# Patient Record
Sex: FEMALE | Race: WHITE | NOT HISPANIC OR LATINO | URBAN - METROPOLITAN AREA
[De-identification: names, ages, dates, MRNs, and addresses within clinical notes are randomized per-mention and may not be internally consistent; named-entity substitution may affect disease eponyms.]

---

## 2017-03-23 ENCOUNTER — IMPORTED ENCOUNTER (OUTPATIENT)
Dept: URBAN - METROPOLITAN AREA CLINIC 1 | Facility: CLINIC | Age: 67
End: 2017-03-23

## 2017-06-26 ENCOUNTER — HOSPITAL ENCOUNTER (OUTPATIENT)
Dept: PHYSICAL THERAPY | Age: 67
Discharge: HOME | End: 2017-06-26
Payer: MEDICARE

## 2017-06-26 PROCEDURE — 97530 THERAPEUTIC ACTIVITIES: CPT

## 2017-06-26 PROCEDURE — G8984 CARRY CURRENT STATUS: HCPCS

## 2017-06-26 PROCEDURE — 97161 PT EVAL LOW COMPLEX 20 MIN: CPT

## 2017-06-26 PROCEDURE — 97110 THERAPEUTIC EXERCISES: CPT

## 2017-06-26 PROCEDURE — G8985 CARRY GOAL STATUS: HCPCS

## 2017-06-26 NOTE — PROGRESS NOTES
Michael [facility]er [facility]  [facility] PHYSICAL THERAPY  [address] [place] [address]ras, [address] - Phone: [phone]  Fax: [phone] / [address]  Patient Name: Rema Aranda : 1950   Medical   Diagnosis: Left shoulder pain [M25.512] Treatment Diagnosis: L SAD RCR   Onset Date: 17     Referral Source: Ellie Menchaca MD [facility], [place]): 2017   Prior Hospitalization: See medical history Provider #: [id number]   Prior Level of Function: Performing tasks around the home and in the yard   Comorbidities: See med hx   Medications: Verified on Patient Summary List   The Plan of Care and following information is based on the information from the initial evaluation.   ===========================================================================================  Assessment / key information: Patient is a 78 y.o. female s/p L SAD RCR. Pt reports being unable to sleep for more than 2 hours a night due to pain at rest and having to readjust sling. Pt shows desire to return to performing overhead ADLs and yardwork with L shoulder. Pt presents with the following ROM measurements:                                         AROM                                                              PROM    Left Right   Left   Flexion  Flexion 87   Extension NT 65 Extension NT   Scaption/ABD  Scaption/   ER @ 0 Degrees NT NT ER @ 0 Degrees NT   ER @ 90 Degrees NT 89 ER @ 90 Degrees NT   IR @ 90 Degrees NT 81 IR @ 90 Degrees NT   IR HBB NT NT IR HBB NT     Strength not assessed on L shoulder at this time due to recent surgery. We reviewed her precautions to which she verbalized understanding. Due to lack of transportation and and driving contraindication, patient will transfer to our [facility] clinic, which is walking distance from her home.  Pt would benefit from skilled PT services to address her impairments (listed below),  educate her, and improve her level of function. Thanks for your referral.   ===========================================================================================  Eval Complexity: History: MEDIUM  Complexity : 1-2 comorbidities / personal factors will impact the outcome/ POC Exam:LOW Complexity : 1-2 Standardized tests and measures addressing body structure, function, activity limitation and / or participation in recreation  Presentation: LOW Complexity : Stable, uncomplicated  Clinical Decision Making:Other outcome measures FOTO, QuickDASH  LOW Overall Complexity:LOW   Problem List: pain affecting function, decrease ROM, decrease strength, edema affecting function, impaired gait/ balance, decrease ADL/ functional abilitiies, decrease activity tolerance, decrease flexibility/ joint mobility and decrease transfer abilities   Treatment Plan may include any combination of the following: Therapeutic exercise, Therapeutic activities, Neuromuscular re-education, Physical agent/modality, Manual therapy, Patient education, Self Care training, Functional mobility training, Home safety training and Stair training  Patient / Family readiness to learn indicated by: asking questions, trying to perform skills and interest  Persons(s) to be included in education: patient (P)  Barriers to Learning/Limitations: None  Measures taken: n/a   Patient Goal (s): To use her arm again in yardwork and ADLs in the home   Patient self reported health status: good  Rehabilitation Potential: good   Short Term Goals: To be accomplished in  6  weeks:  1. Patient to be adherent to protocol precautions to ensure safety and proper healing  2. Patient to achieve PROM L shoulder flexion of > 130 degrees in order to prepare for AROM to reach overhead objects  3. Patient to achieve PROM L shoulder abduction of > 130 degrees in order to prepare for AROM to reach objects to the side   Long Term Goals: To be accomplished in  12  weeks:  1. Patient to be Independent with HEP to self-manage/prevent symptoms after DC. 2. Patient to achieve AROM L shoulder flexion of 140 degrees in order to facilitate ability to perform overhead activities and lifting light objects on top shelf  3. Patient to achieve AROM L shoulder abduction of 140 degrees in order to facilitate ability to perform washing/brushing of hair  4. Patient to improve FOTO score to > Stage 4 to indicate increased functional independence. Frequency / Duration:   Patient to be seen  2-3  times per week for 12  weeks:  Patient / Caregiver education and instruction: self care, activity modification, brace/ splint application and exercises. We reviewed our facility's Patient Personal Responsibilities (PPR) form, particularly in regards to compliance towards her appointment time, our attendance policy, and her home exercise program. Patient was informed of possible discharge for non-compliance to our attendance policy per PPR form. We also discussed her POC as deemed appropriate by the treating therapist and physician. Patient verbalized understanding that she must show objective and functional improvement in an appropriate time frame. Patient verbalized understanding that should progress or compliance be lacking, we will contact the referring physician for further consultation to address and attempt to establish alternate treatment strategies as necessary and/or possibly discharge. G-Codes (GP): Carry [id number] Current  CN= 100%. The severity rating is based on the FOTO Score and Quick DASH    Therapist Signature: Torsten Escamilla \"BJ\" Geovani Rees DPT, Cert. MDT, Cert. DN, Cert. SMT Date: 9/65/3679   Certification Period: 6/26/17 - 9/26/17 Time: 1:08 PM   ===========================================================================================  I certify that the above Physical Therapy Services are being furnished while the patient is under my care.   I agree with the treatment plan and certify that this therapy is necessary. Physician Signature:        Date:       Time:     Please sign and return to [employer] or you may fax the signed copy to [phone]. Thank you.

## 2017-06-27 ENCOUNTER — HOSPITAL ENCOUNTER (OUTPATIENT)
Dept: PHYSICAL THERAPY | Age: 67
Discharge: HOME | End: 2017-06-27
Payer: MEDICARE

## 2017-06-27 PROCEDURE — 97110 THERAPEUTIC EXERCISES: CPT | Performed by: PHYSICAL THERAPIST

## 2017-06-27 PROCEDURE — 97140 MANUAL THERAPY 1/> REGIONS: CPT | Performed by: PHYSICAL THERAPIST

## 2017-06-27 NOTE — PROGRESS NOTES
PT DAILY TREATMENT NOTE     Patient Name: Kyrie Durán  Date:2017  : 1950  [x]  Patient  Verified  Payor: Carlos Woodward / Plan: [place] / Product Type: Medicare /    In time:155pm  Out time:245pm  Total Treatment Time (min): 50  Total Timed Codes (min): 50  1:1 Treatment Time (min): 40   Visit #: 2 of     Treatment Area: Left shoulder pain [M25.512]    SUBJECTIVE  Pain Level (0-10 scale): 6  Any medication changes, allergies to medications, adverse drug reactions, diagnosis change, or new procedure performed?: [x] No    [] Yes (see summary sheet for update)  Subjective functional status/changes:   [] No changes reported  \" I took my pain medication at about 1 today, I am still waking every couple of hours. \"    OBJECTIVE  Modality rationale: decrease inflammation and decrease pain to improve the patients ability to relax for reduction mm tension   Min Type Additional Details    [] Estim: []Att   []Unatt        []TENS instruct                  []IFC  []Premod   []NMES                     []Other:  []w/US   []w/ice   []w/heat  Position:  Location:    []  Traction: [] Cervical       []Lumbar                       [] Prone          []Supine                       []Intermittent   []Continuous Lbs:  [] before manual  [] after manual    []  Ultrasound: []Continuous   [] Pulsed                           []1MHz   []3MHz Location:  W/cm2:    []  Iontophoresis with dexamethasone         Location: [] Take home patch   [] In clinic   10 [x]  Ice     []  heat  []  Ice massage Position:supine  Location: L shoulder    []  Vasopneumatic Device Pressure:       [] lo [] med [] hi   Temperature: [] lo [] med [] hi   [] Skin assessment post-treatment:  []intact []redness- no adverse reaction       []redness  adverse reaction:       15 min Therapeutic Exercise:  [x] See flow sheet :   Rationale: increase ROM, increase strength and increase proprioception to improve the patients ability to perform functional mobility and improve activity  endurance        25 min Manual Therapy:  PROM flexion, scaption , ER, SL scapular mobes    Rationale: decrease pain, increase ROM, increase tissue extensibility, decrease trigger points and increase postural awareness to allow for progression to AAROM and maintain mm flexibility. min Patient Education: [x] Review HEP    [] Progressed/Changed HEP based on:   [] positioning   [] body mechanics   [] transfers   [] heat/ice application        Other Objective/Functional Measures: Initiated POC , PROM with mild mm guarding and constant VC for relaxation, tolerated MT well. 130 deg  PROM FLexion  Scaption: 105 deg PROM    Pain Level (0-10 scale) post treatment: 6    ASSESSMENT/Changes in Function: Patient tolerated initial treatment well with mild mm guarding and VC needed for relaxation. PROM is progressing well, and patient to be educated in and start table slides next visit. Educated in shoulder rolls and scap retraction for HEP    Patient will continue to benefit from skilled PT services to modify and progress therapeutic interventions, address functional mobility deficits, address ROM deficits, address strength deficits, analyze and address soft tissue restrictions, analyze and cue movement patterns, analyze and modify body mechanics/ergonomics and assess and modify postural abnormalities to attain remaining goals. []  See Plan of Care  []  See progress note/recertification  []  See Discharge Summary         Progress towards goals / Updated goals: · Short Term Goals: To be accomplished in  6  weeks:  1. Patient to be adherent to protocol precautions to ensure safety and proper healing  2. Patient to achieve PROM L shoulder flexion of > 130 degrees in order to prepare for AROM to reach overhead objects  3. Patient to achieve PROM L shoulder abduction of > 130 degrees in order to prepare for AROM to reach objects to the side  · Long Term Goals:  To be accomplished in  12  weeks:  1. Patient to be Independent with HEP to self-manage/prevent symptoms after DC. 2. Patient to achieve AROM L shoulder flexion of 140 degrees in order to facilitate ability to perform overhead activities and lifting light objects on top shelf  3. Patient to achieve AROM L shoulder abduction of 140 degrees in order to facilitate ability to perform washing/brushing of hair  4. Patient to improve FOTO score to > Stage 4 to indicate increased functional independence.      PLAN  []  Upgrade activities as tolerated     []  Continue plan of care  []  Update interventions per flow sheet       []  Discharge due to:_  []  Other:_      Genna Garcia, PT 6/27/2017  10:45 AM

## 2017-06-29 ENCOUNTER — HOSPITAL ENCOUNTER (OUTPATIENT)
Dept: PHYSICAL THERAPY | Age: 67
Discharge: HOME | End: 2017-06-29
Payer: MEDICARE

## 2017-06-29 PROCEDURE — 97016 VASOPNEUMATIC DEVICE THERAPY: CPT

## 2017-06-29 PROCEDURE — 97110 THERAPEUTIC EXERCISES: CPT

## 2017-06-29 PROCEDURE — 97140 MANUAL THERAPY 1/> REGIONS: CPT

## 2017-06-29 NOTE — PROGRESS NOTES
PT DAILY TREATMENT NOTE     Patient Name: Tam Fernández  Date:2017  : 1950  [x]  Patient  Verified  Payor: Renny Frankel / Plan: [place] MED[place] & [place] / Product Type: Medicare /    In time: 8:34am  Out time: 9:26am  Total Treatment Time (min): 52  Total Timed Codes (min): 42  1:1 Treatment Time (min): 30  Visit #: 3 of     Treatment Area: Left shoulder pain [M25.512]    SUBJECTIVE  Pain Level (0-10 scale): 2  Any medication changes, allergies to medications, adverse drug reactions, diagnosis change, or new procedure performed?: [x] No    [] Yes (see summary sheet for update)  Subjective functional status/changes:   [] No changes reported  \"It feels better. \"    OBJECTIVE  Modality rationale: decrease inflammation and decrease pain to improve the patients ability to relax for reduction mm tension   Min Type Additional Details    [] Estim: []Att   []Unatt        []TENS instruct                  []IFC  []Premod   []NMES                     []Other:  []w/US   []w/ice   []w/heat  Position:  Location:    []  Traction: [] Cervical       []Lumbar                       [] Prone          []Supine                       []Intermittent   []Continuous Lbs:  [] before manual  [] after manual    []  Ultrasound: []Continuous   [] Pulsed                           []1MHz   []3MHz Location:  W/cm2:    []  Iontophoresis with dexamethasone         Location: [] Take home patch   [] In clinic    []  Ice     []  heat  []  Ice massage Position:supine  Location: L shoulder   10 [x]  Vasopneumatic Device Pressure:       [] lo [x] med [] hi   Temperature: [x] lo [] med [] hi   [x] Skin assessment post-treatment:  [x]intact []redness- no adverse reaction       []redness  adverse reaction:       27 min Therapeutic Exercise:  [x] See flow sheet: added table slides for flexion/scaption (additional time spent on cueing to utilize the hips to passively move the arm)   Rationale: increase ROM, increase strength and increase proprioception to improve the patients ability to perform functional mobility and improve activity  endurance    15 min Manual Therapy:  PROM flexion, scaption, [email or web address]   Rationale: decrease pain, increase ROM, increase tissue extensibility, decrease trigger points and increase postural awareness to allow for progression to AAROM and maintain mm flexibility. X min Patient Education: [x] Review HEP - added tables slides for flexion, scaption; discussed intensity of stretch into a gentle pain-free ROM, use of hips for table slides at home     Other Objective/Functional Measures:    Scaption: 115deg passive, with table slides, pain-free    Pain Level (0-10 scale) post treatment: 0    ASSESSMENT/Changes in Function:   Improved passive mobility into scaption today. Patient demo passive ER to ~neutral today at [address] Cont'd cueing to reduce mm guarding (~75deg scap), therefore, inc time spent on manual to progress passive mobility to 115deg today. Initiated VASO today in sitting to address GHJ edema/inflammation with (+) results. Patient will continue to benefit from skilled PT services to modify and progress therapeutic interventions, address functional mobility deficits, address ROM deficits, address strength deficits, analyze and address soft tissue restrictions, analyze and cue movement patterns, analyze and modify body mechanics/ergonomics and assess and modify postural abnormalities to attain remaining goals. []  See Plan of Care  []  See progress note/recertification  []  See Discharge Summary         Progress towards goals / Updated goals:  Short Term Goals: To be accomplished in  6  weeks:  1. Patient to be adherent to protocol precautions to ensure safety and proper healing  2. Patient to achieve PROM L shoulder flexion of > 130 degrees in order to prepare for AROM to reach overhead objects  3.  Patient to achieve PROM L shoulder abduction of > 130 degrees in order to prepare for AROM to reach objects to the side  Long Term Goals: To be accomplished in  12  weeks:  1. Patient to be Independent with HEP to self-manage/prevent symptoms after DC. 2. Patient to achieve AROM L shoulder flexion of 140 degrees in order to facilitate ability to perform overhead activities and lifting light objects on top shelf  3. Patient to achieve AROM L shoulder abduction of 140 degrees in order to facilitate ability to perform washing/brushing of hair  4. Patient to improve FOTO score to > Stage 4 to indicate increased functional independence.      PLAN  [x]  Upgrade activities as tolerated     [x]  Continue plan of care  []  Update interventions per flow sheet       []  Discharge due to:_  [x]  Other: assess response to modified HEP    Delmar Meckel, JACKELYN  6/29/2017

## 2017-07-03 ENCOUNTER — HOSPITAL ENCOUNTER (OUTPATIENT)
Dept: PHYSICAL THERAPY | Age: 67
Discharge: HOME | End: 2017-07-03
Payer: MEDICARE

## 2017-07-03 PROCEDURE — 97140 MANUAL THERAPY 1/> REGIONS: CPT

## 2017-07-03 PROCEDURE — 97110 THERAPEUTIC EXERCISES: CPT

## 2017-07-03 PROCEDURE — 97016 VASOPNEUMATIC DEVICE THERAPY: CPT

## 2017-07-03 NOTE — PROGRESS NOTES
PT DAILY TREATMENT NOTE     Patient Name: Jossy Arambula  Date:7/3/2017  : 1950  [x]  Patient  Verified  Payor: [place] MEDICARE / Plan: [place] MEDICARE PART A & B / Product Type: Medicare /    In time: 2:21pm  Out time: 3:11pm  Total Treatment Time (min): 50  Total Timed Codes (min): 40  1:1 Treatment Time (min): 35  Visit #: 4 of     Treatment Area: Left shoulder pain [M25.512]    SUBJECTIVE  Pain Level (0-10 scale): 3  Any medication changes, allergies to medications, adverse drug reactions, diagnosis change, or new procedure performed?: [x] No    [] Yes (see summary sheet for update)  Subjective functional status/changes:   [] No changes reported  \"I feel okay today. It was pinching a little with the table slides. \"    OBJECTIVE  Modality rationale: decrease inflammation and decrease pain to improve the patients ability to relax for reduction mm tension   Min Type Additional Details    [] Estim: []Att   []Unatt        []TENS instruct                  []IFC  []Premod   []NMES                     []Other:  []w/US   []w/ice   []w/heat  Position:  Location:    []  Traction: [] Cervical       []Lumbar                       [] Prone          []Supine                       []Intermittent   []Continuous Lbs:  [] before manual  [] after manual    []  Ultrasound: []Continuous   [] Pulsed                           []1MHz   []3MHz Location:  W/cm2:    []  Iontophoresis with dexamethasone         Location: [] Take home patch   [] In clinic    []  Ice     []  heat  []  Ice massage Position:supine  Location: L shoulder   10 [x]  Vasopneumatic Device Pressure:       [] lo [x] med [] hi   Temperature: [x] lo [] med [] hi   [x] Skin assessment post-treatment:  [x]intact []redness- no adverse reaction       []redness  adverse reaction:     25 min Therapeutic Exercise:  [x] See flow sheet:   Rationale: increase ROM, increase strength and increase proprioception to improve the patients ability to perform functional mobility and improve activity  endurance     15 min Manual Therapy:  (R) S/L (L) scap mobs; grade I/II GHJ mobs for pain relief; PROM flexion, scaption, [email or web address], gentle oscillations to reduce guarding   Rationale: decrease pain, increase ROM, increase tissue extensibility, decrease trigger points and increase postural awareness to allow for progression to AAROM and maintain mm flexibility. X min Patient Education: [x] Review HEP     Other Objective/Functional Measures:    (L) shoulder PROM: flex ~150deg, scap ~140deg, [email or web address] ~5deg    Pain Level (0-10 scale) post treatment: 0    ASSESSMENT/Changes in Function:   Improved shoulder mobility. Patient req VCs to reduce mm guarding. Reviewed positioning for table slides. Patient will continue to benefit from skilled PT services to modify and progress therapeutic interventions, address functional mobility deficits, address ROM deficits, address strength deficits, analyze and address soft tissue restrictions, analyze and cue movement patterns, analyze and modify body mechanics/ergonomics and assess and modify postural abnormalities to attain remaining goals. [x]  See Plan of Care  []  See progress note/recertification  []  See Discharge Summary         Progress towards goals / Updated goals:  Short Term Goals: To be accomplished in  6  weeks:  1. Patient to be adherent to protocol precautions to ensure safety and proper healing  2. Patient to achieve PROM L shoulder flexion of > 130 degrees in order to prepare for AROM to reach overhead objects. -Goal met; able to achieve PROM to ~150deg (7/3/17)  3. Patient to achieve PROM L shoulder abduction of > 130 degrees in order to prepare for AROM to reach objects to the side  Long Term Goals: To be accomplished in  12  weeks:  1. Patient to be Independent with HEP to self-manage/prevent symptoms after DC.   2. Patient to achieve AROM L shoulder flexion of 140 degrees in order to facilitate ability to perform overhead activities and lifting light objects on top shelf  3. Patient to achieve AROM L shoulder abduction of 140 degrees in order to facilitate ability to perform washing/brushing of hair  4. Patient to improve FOTO score to > stage 4 to indicate increased functional independence.      PLAN  [x]  Upgrade activities as tolerated     [x]  Continue plan of care  []  Update interventions per flow sheet       []  Discharge due to:_  []  Other:_    Surinder Nino, JACKELYN  7/3/2017

## 2017-07-06 ENCOUNTER — HOSPITAL ENCOUNTER (OUTPATIENT)
Dept: PHYSICAL THERAPY | Age: 67
Discharge: HOME | End: 2017-07-06
Payer: MEDICARE

## 2017-07-06 PROCEDURE — 97016 VASOPNEUMATIC DEVICE THERAPY: CPT

## 2017-07-06 PROCEDURE — 97140 MANUAL THERAPY 1/> REGIONS: CPT

## 2017-07-06 NOTE — PROGRESS NOTES
PT DAILY TREATMENT NOTE     Patient Name: Juliet Sanchez  Date:2017  : 1950  [x]  Patient  Verified  Payor: [place] MED[place]ARE / Plan: [place] MEDICARE PART A & B / Product Type: Medicare /    In time: 2:30pm       Out time: 3:26pm  Total Treatment Time (min):56  Total Timed Codes (min): 46  1:1 Treatment Time (min): 30  Visit #: 5 of     Treatment Area: Left shoulder pain [M25.512]    SUBJECTIVE  Pain Level (0-10 scale): 3  Any medication changes, allergies to medications, adverse drug reactions, diagnosis change, or new procedure performed?: [x] No    [] Yes (see summary sheet for update)  Subjective functional status/changes:   [] No changes reported  \"I could finally sleep for 4 hours last night. It was amazing. I saw the PA yesterday for my two week follow up and she was pleased with how I've been doing. \"    OBJECTIVE  Modality rationale: decrease inflammation and decrease pain to improve the patients ability to relax for reduction mm tension   Min Type Additional Details    [] Estim: []Att   []Unatt        []TENS instruct                  []IFC  []Premod   []NMES                     []Other:  []w/US   []w/ice   []w/heat  Position:  Location:    []  Traction: [] Cervical       []Lumbar                       [] Prone          []Supine                       []Intermittent   []Continuous Lbs:  [] before manual  [] after manual    []  Ultrasound: []Continuous   [] Pulsed                           []1MHz   []3MHz Location:  W/cm2:    []  Iontophoresis with dexamethasone         Location: [] Take home patch   [] In clinic    []  Ice     []  heat  []  Ice massage Position:supine  Location: L shoulder   10 [x]  Vasopneumatic Device Pressure:       [] lo [x] med [] hi   Temperature: [x] lo [] med [] hi   [x] Skin assessment post-treatment:  [x]intact []redness- no adverse reaction       []redness  adverse reaction:     26 min Therapeutic Exercise:  [x] See flow sheet: (N/C) no changes due to protocol Rationale: increase ROM, increase strength and increase proprioception to improve the patients ability to perform functional mobility and improve activity  endurance     20 min Manual Therapy:  (R) S/L (L) scap mobs with STM to UT/LS and latissimus dorsi; grade I/II GHJ mobs for pain relief; PROM flexion, scaption, [email or web address], gentle oscillations to reduce guarding   Rationale: decrease pain, increase ROM, increase tissue extensibility, decrease trigger points and increase postural awareness to allow for progression to AAROM and maintain mm flexibility. X min Patient Education: [x] Review HEP; reviewed post-surgical precautions, use of sling at all times, discussed rationale for persistent neck tightness due to lack of RTC strength     Other Objective/Functional Measures:     Pain Level (0-10 scale) post treatment: 0    ASSESSMENT/Changes in Function:   Passive mobility maintained from last session following gentle scap address. Significant UT, LS, and lat tightness slowly resolving. Patient will continue to benefit from skilled PT services to modify and progress therapeutic interventions, address functional mobility deficits, address ROM deficits, address strength deficits, analyze and address soft tissue restrictions, analyze and cue movement patterns, analyze and modify body mechanics/ergonomics and assess and modify postural abnormalities to attain remaining goals. [x]  See Plan of Care  []  See progress note/recertification  []  See Discharge Summary         Progress towards goals / Updated goals:  Short Term Goals: To be accomplished in  6  weeks:  1. Patient to be adherent to protocol precautions to ensure safety and proper healing  2. Patient to achieve PROM L shoulder flexion of > 130 degrees in order to prepare for AROM to reach overhead objects. -Goal met; able to achieve PROM to ~150deg (7/3/17)  3.  Patient to achieve PROM L shoulder abduction of > 130 degrees in order to prepare for AROM to reach objects to the side  Long Term Goals: To be accomplished in  12  weeks:  1. Patient to be Independent with HEP to self-manage/prevent symptoms after DC. 2. Patient to achieve AROM L shoulder flexion of 140 degrees in order to facilitate ability to perform overhead activities and lifting light objects on top shelf  3. Patient to achieve AROM L shoulder abduction of 140 degrees in order to facilitate ability to perform washing/brushing of hair  4. Patient to improve FOTO score to > stage 4 to indicate increased functional independence.      PLAN  [x]  Upgrade activities as tolerated     [x]  Continue plan of care  []  Update interventions per flow sheet       []  Discharge due to:_  [x]  Other: cont per MD protocol    Tonia Casarez, PTA  7/6/2017

## 2017-07-07 ENCOUNTER — HOSPITAL ENCOUNTER (OUTPATIENT)
Dept: PHYSICAL THERAPY | Age: 67
Discharge: HOME | End: 2017-07-07
Payer: MEDICARE

## 2017-07-07 PROCEDURE — 97140 MANUAL THERAPY 1/> REGIONS: CPT

## 2017-07-07 PROCEDURE — 97016 VASOPNEUMATIC DEVICE THERAPY: CPT

## 2017-07-07 NOTE — PROGRESS NOTES
PT DAILY TREATMENT NOTE     Patient Name: Nia Bourgeois  Date:2017  : 1950  [x]  Patient  Verified  Payor: [place] MEDICARE / Plan: [place] MEDICARE PART A & B / Product Type: Medicare /    In time: 2:05pm       Out time: 3:00pm  Total Treatment Time (min): 55  Total Timed Codes (min): 45  1:1 Treatment Time (min): 30  Visit #: 6 of     Treatment Area: Left shoulder pain [M25.512]    SUBJECTIVE  Pain Level (0-10 scale): 3  Any medication changes, allergies to medications, adverse drug reactions, diagnosis change, or new procedure performed?: [x] No    [] Yes (see summary sheet for update)  Subjective functional status/changes:   [] No changes reported  \"I could finally sleep for 4 hours last night. It was amazing. I saw the PA yesterday for my two week follow up and she was pleased with how I've been doing. \"    OBJECTIVE  Modality rationale: decrease inflammation and decrease pain to improve the patients ability to relax for reduction mm tension   Min Type Additional Details    [] Estim: []Att   []Unatt        []TENS instruct                  []IFC  []Premod   []NMES                     []Other:  []w/US   []w/ice   []w/heat  Position:  Location:    []  Traction: [] Cervical       []Lumbar                       [] Prone          []Supine                       []Intermittent   []Continuous Lbs:  [] before manual  [] after manual    []  Ultrasound: []Continuous   [] Pulsed                           []1MHz   []3MHz Location:  W/cm2:    []  Iontophoresis with dexamethasone         Location: [] Take home patch   [] In clinic    []  Ice     []  heat  []  Ice massage Position:supine  Location: L shoulder   10 [x]  Vasopneumatic Device Pressure:       [] lo [x] med [] hi   Temperature: [x] lo [] med [] hi   [x] Skin assessment post-treatment:  [x]intact []redness- no adverse reaction       []redness  adverse reaction:     26 min Therapeutic Exercise:  [x] See flow sheet: (N/C) no changes due to protocol Rationale: increase ROM, increase strength and increase proprioception to improve the patients ability to perform functional mobility and improve activity  endurance     30 min Manual Therapy:  STM to UT/LS, teres major, and lat; (R) S/L (L) scap mobs; grade II GHJ mobs for pain relief; PROM flexion, scaption, [email or web address] to neutral, gentle oscillations to reduce guarding   Rationale: decrease pain, increase ROM, increase tissue extensibility, decrease trigger points and increase postural awareness to allow for progression to AAROM and maintain mm flexibility. X min Patient Education: [x] Review HEP; reviewed post-surgical precautions, use of sling at all times, discussed rationale and stretching for persistent neck tightness due to lack of RTC strength     Other Objective/Functional Measures:    PROM: flex ~145deg, scap ~135deg, [email or web address] neutral    Pain Level (0-10 scale) post treatment: 0    ASSESSMENT/Changes in Function:   Persistent UT, LS, and lat tightness addressing well with manual interventions. Dowager's hump developing - discussed positioning of the shoulder and neck in sitting to reduce UT tightness. Patient will continue to benefit from skilled PT services to modify and progress therapeutic interventions, address functional mobility deficits, address ROM deficits, address strength deficits, analyze and address soft tissue restrictions, analyze and cue movement patterns, analyze and modify body mechanics/ergonomics and assess and modify postural abnormalities to attain remaining goals. [x]  See Plan of Care  []  See progress note/recertification  []  See Discharge Summary         Progress towards goals / Updated goals:  Short Term Goals: To be accomplished in  6  weeks:  1. Patient to be adherent to protocol precautions to ensure safety and proper healing  2. Patient to achieve PROM L shoulder flexion of > 130 degrees in order to prepare for AROM to reach overhead objects.  -Goal met; able to achieve PROM to ~145 deg flex (7/7/17)  3. Patient to achieve PROM L shoulder abduction of > 130 degrees in order to prepare for AROM to reach objects to the side. -Goal progressing; scaption to ~135 deg passive (7/7/17)  Long Term Goals: To be accomplished in  12  weeks:  1. Patient to be Independent with HEP to self-manage/prevent symptoms after DC. 2. Patient to achieve AROM L shoulder flexion of 140 degrees in order to facilitate ability to perform overhead activities and lifting light objects on top shelf  3. Patient to achieve AROM L shoulder abduction of 140 degrees in order to facilitate ability to perform washing/brushing of hair  4. Patient to improve FOTO score to > stage 4 to indicate increased functional independence.      PLAN  [x]  Upgrade activities as tolerated     [x]  Continue plan of care  []  Update interventions per flow sheet       []  Discharge due to:_  [x]  Other: cont per MD protocol; hold ER passed neutral    Ajith Escamilla PTA  7/7/2017

## 2017-07-10 ENCOUNTER — HOSPITAL ENCOUNTER (OUTPATIENT)
Dept: PHYSICAL THERAPY | Age: 67
Discharge: HOME | End: 2017-07-10
Payer: MEDICARE

## 2017-07-10 PROCEDURE — 97016 VASOPNEUMATIC DEVICE THERAPY: CPT

## 2017-07-10 PROCEDURE — 97140 MANUAL THERAPY 1/> REGIONS: CPT

## 2017-07-10 PROCEDURE — 97110 THERAPEUTIC EXERCISES: CPT

## 2017-07-10 NOTE — PROGRESS NOTES
PT DAILY TREATMENT NOTE     Patient Name: Juliet Sanchez  Date:7/10/2017  : 1950  [x]  Patient  Verified  Payor: [place] MEDICARE / Plan: [place] MEDICARE PART A & B / Product Type: Medicare /    In time: 439  Out time:927  Total Treatment Time (min): 56  Total Timed Codes (min): 46  1:1 Treatment Time (min): 126- 955  (28)  Visit #: 7 of 8-10    Treatment Area: Left shoulder pain [M25.512]    SUBJECTIVE  Pain Level (0-10 scale): 4  Any medication changes, allergies to medications, adverse drug reactions, diagnosis change, or new procedure performed?: [x] No    [] Yes (see summary sheet for update)  Subjective functional status/changes:   [] No changes reported  \"I had a god workout last week and I have been working hard at home. \"    OBJECTIVE  Modality rationale: decrease inflammation and decrease pain to improve the patients ability to relax for reduction mm tension   Min Type Additional Details    [] Estim: []Att   []Unatt        []TENS instruct                  []IFC  []Premod   []NMES                     []Other:  []w/US   []w/ice   []w/heat  Position:  Location:    []  Traction: [] Cervical       []Lumbar                       [] Prone          []Supine                       []Intermittent   []Continuous Lbs:  [] before manual  [] after manual    []  Ultrasound: []Continuous   [] Pulsed                           []1MHz   []3MHz Location:  W/cm2:    []  Iontophoresis with dexamethasone         Location: [] Take home patch   [] In clinic    []  Ice     []  heat  []  Ice massage Position:supine  Location: L shoulder   10 [x]  Vasopneumatic Device Pressure:       [] lo [x] med [] hi   Temperature: [x] lo [] med [] hi   [x] Skin assessment post-treatment:  [x]intact []redness- no adverse reaction       []redness  adverse reaction:     22 min Therapeutic Exercise:  [x] See flow sheet:    Rationale: increase ROM, increase strength and increase proprioception to improve the patients ability to perform functional mobility and improve activity  endurance     24 min Manual Therapy:  PROM all directions, grade 1-2 joint mobs in circumduction, SL scap mobs and UT release    Rationale: decrease pain, increase ROM, increase tissue extensibility, decrease trigger points and increase postural awareness to allow for progression to AAROM and maintain mm flexibility. X min Patient Education: [x] Review HEP- recommended UT cane release      Other Objective/Functional Measures:    STG 1- protcol, sling use, arm use : good compliance as patient has had R shoulder done previously     ER at 45 de deg after MT     Pain Level (0-10 scale) post treatment: 0    ASSESSMENT/Changes in Function: Patient is 2.5 weeks post op. ER is limited but loosened well with MT. Cont co CS tightness and recommend UT cane release at home to counteract sling use. Patient will continue to benefit from skilled PT services to modify and progress therapeutic interventions, address functional mobility deficits, address ROM deficits, address strength deficits, analyze and address soft tissue restrictions, analyze and cue movement patterns, analyze and modify body mechanics/ergonomics and assess and modify postural abnormalities to attain remaining goals. [x]  See Plan of Care  []  See progress note/recertification  []  See Discharge Summary         Progress towards goals / Updated goals:  Short Term Goals: To be accomplished in  6  weeks:  1. Patient to be adherent to protocol precautions to ensure safety and proper healing - goal met - compliant with protocol  7/10/17  2. Patient to achieve PROM L shoulder flexion of > 130 degrees in order to prepare for AROM to reach overhead objects. -Goal met; able to achieve PROM to ~145 deg flex (17)  3. Patient to achieve PROM L shoulder abduction of > 130 degrees in order to prepare for AROM to reach objects to the side.  -Goal progressing; scaption to ~135 deg passive (17)  Long Term Goals: To be accomplished in  12  weeks:  1. Patient to be Independent with HEP to self-manage/prevent symptoms after DC. 2. Patient to achieve AROM L shoulder flexion of 140 degrees in order to facilitate ability to perform overhead activities and lifting light objects on top shelf  3. Patient to achieve AROM L shoulder abduction of 140 degrees in order to facilitate ability to perform washing/brushing of hair  4. Patient to improve FOTO score to > stage 4 to indicate increased functional independence.      PLAN  [x]  Upgrade activities as tolerated     [x]  Continue plan of care  []  Update interventions per flow sheet       []  Discharge due to:_  [x]  Other: progress per MD protocol     Nghia Perez, PT  7/10/2017

## 2017-07-12 ENCOUNTER — HOSPITAL ENCOUNTER (OUTPATIENT)
Dept: PHYSICAL THERAPY | Age: 67
Discharge: HOME | End: 2017-07-12
Payer: MEDICARE

## 2017-07-12 PROCEDURE — 97016 VASOPNEUMATIC DEVICE THERAPY: CPT

## 2017-07-12 PROCEDURE — 97140 MANUAL THERAPY 1/> REGIONS: CPT

## 2017-07-12 NOTE — PROGRESS NOTES
PT DAILY TREATMENT NOTE     Patient Name: Karmen Hanks  Date:2017  : 1950  [x]  Patient  Verified  Payor: [place] MEDICARE / Plan: [place] MEDICARE PART A & B / Product Type: Medicare /    In time: 12:04pm     Out time: 1:00pm  Total Treatment Time (min): 56  Total Timed Codes (min): 46  1:1 Treatment Time (min): 30  Visit #: 8 of 8-10    Treatment Area: Left shoulder pain [M25.512]    SUBJECTIVE  Pain Level (0-10 scale): 3-4  Any medication changes, allergies to medications, adverse drug reactions, diagnosis change, or new procedure performed?: [x] No    [] Yes (see summary sheet for update)  Subjective functional status/changes:   [] No changes reported  \"It's the neck that hurts. \"    OBJECTIVE  Modality rationale: decrease inflammation and decrease pain to improve the patients ability to relax for reduction mm tension   Min Type Additional Details    [] Estim: []Att   []Unatt        []TENS instruct                  []IFC  []Premod   []NMES                     []Other:  []w/US   []w/ice   []w/heat  Position:  Location:    []  Traction: [] Cervical       []Lumbar                       [] Prone          []Supine                       []Intermittent   []Continuous Lbs:  [] before manual  [] after manual    []  Ultrasound: []Continuous   [] Pulsed                           []1MHz   []3MHz Location:  W/cm2:    []  Iontophoresis with dexamethasone         Location: [] Take home patch   [] In clinic    []  Ice     []  heat  []  Ice massage Position:supine  Location: L shoulder   10 [x]  Vasopneumatic Device Pressure:       [] lo [x] med [] hi   Temperature: [x] lo [] med [] hi   [x] Skin assessment post-treatment:  [x]intact []redness- no adverse reaction       []redness  adverse reaction:     20 min Therapeutic Exercise:  [x] See flow sheet: added table slides for ER   Rationale: increase ROM, increase strength and increase proprioception to improve the patients ability to perform functional mobility and improve activity  endurance     30 min Manual Therapy:  STM to UT/LS, teres major, pect, and lat; (R) S/L (L) scap mobs; grade II GHJ mobs for pain relief; PROM flexion, scaption, [email or web address], gentle oscillations to reduce guarding   Rationale: decrease pain, increase ROM, increase tissue extensibility, decrease trigger points and increase postural awareness to allow for progression to AAROM and maintain mm flexibility. X min Patient Education: [x] Review HEP     Other Objective/Functional Measures:    (L) shoulder PROM: flex/scap ~150deg,     Pain Level (0-10 scale) post treatment: 0    ASSESSMENT/Changes in Function:   Significant UT/LS tightness with inc TTP addressed well with manual interventions with corresponding improvement in scapular UR with passive mobility. Added table slides into ER to therex with patient educated on avoiding inc in pain with ROM. Patient will continue to benefit from skilled PT services to modify and progress therapeutic interventions, address functional mobility deficits, address ROM deficits, address strength deficits, analyze and address soft tissue restrictions, analyze and cue movement patterns, analyze and modify body mechanics/ergonomics and assess and modify postural abnormalities to attain remaining goals. [x]  See Plan of Care  []  See progress note/recertification  []  See Discharge Summary         Progress towards goals / Updated goals:  Short Term Goals: To be accomplished in  6  weeks:  1. Patient to be adherent to protocol precautions to ensure safety and proper healing - goal met - compliant with protocol  7/10/17  2. Patient to achieve PROM L shoulder flexion of > 130 degrees in order to prepare for AROM to reach overhead objects. -Goal met; able to achieve PROM to ~150 deg flex (7/12/17)  3. Patient to achieve PROM L shoulder abduction of > 130 degrees in order to prepare for AROM to reach objects to the side.  -Goal progressing; scaption to ~135 deg passive (7/7/17)  Long Term Goals: To be accomplished in  12  weeks:  1. Patient to be Independent with HEP to self-manage/prevent symptoms after DC. 2. Patient to achieve AROM L shoulder flexion of 140 degrees in order to facilitate ability to perform overhead activities and lifting light objects on top shelf  3. Patient to achieve AROM L shoulder abduction of 140 degrees in order to facilitate ability to perform washing/brushing of hair  4. Patient to improve FOTO score to > stage 4 to indicate increased functional independence.      PLAN  [x]  Upgrade activities as tolerated     [x]  Continue plan of care  []  Update interventions per flow sheet       []  Discharge due to:_  [x]  Other: stretch abduction; add table slide ER to [address] PTA  7/12/2017

## 2017-07-14 ENCOUNTER — HOSPITAL ENCOUNTER (OUTPATIENT)
Dept: PHYSICAL THERAPY | Age: 67
Discharge: HOME | End: 2017-07-14
Payer: MEDICARE

## 2017-07-14 PROCEDURE — 97140 MANUAL THERAPY 1/> REGIONS: CPT

## 2017-07-14 PROCEDURE — 97016 VASOPNEUMATIC DEVICE THERAPY: CPT

## 2017-07-14 PROCEDURE — 97110 THERAPEUTIC EXERCISES: CPT

## 2017-07-14 NOTE — PROGRESS NOTES
PT DAILY TREATMENT NOTE     Patient Name: Nessa Saravia  Date:2017  : 1950  [x]  Patient  Verified  Payor: Mela Echevarria / Plan: [place] MEDICARE PART A & B / Product Type: Medicare /    In time: 9:02am     Out time: 10:03am  Total Treatment Time (min): 61  Total Timed Codes (min): 51  1:1 Treatment Time (min): 40  Visit #: 9 of 8-10    Treatment Area: Left shoulder pain [M25.512]    SUBJECTIVE  Pain Level (0-10 scale): 3  Any medication changes, allergies to medications, adverse drug reactions, diagnosis change, or new procedure performed?: [x] No    [] Yes (see summary sheet for update)  Subjective functional status/changes:   [] No changes reported  \"I'm sleeping better. \"    OBJECTIVE  Modality rationale: decrease inflammation and decrease pain to improve the patients ability to relax for reduction mm tension   Min Type Additional Details    [] Estim: []Att   []Unatt        []TENS instruct                  []IFC  []Premod   []NMES                     []Other:  []w/US   []w/ice   []w/heat  Position:  Location:    []  Traction: [] Cervical       []Lumbar                       [] Prone          []Supine                       []Intermittent   []Continuous Lbs:  [] before manual  [] after manual    []  Ultrasound: []Continuous   [] Pulsed                           []1MHz   []3MHz Location:  W/cm2:    []  Iontophoresis with dexamethasone         Location: [] Take home patch   [] In clinic    []  Ice     []  heat  []  Ice massage Position:supine  Location: L shoulder   10 [x]  Vasopneumatic Device Pressure:       [] lo [x] med [] hi   Temperature: [x] lo [] med [] hi   [x] Skin assessment post-treatment:  [x]intact []redness- no adverse reaction       []redness  adverse reaction:     27 min Therapeutic Exercise:  [x] See flow sheet: added scap retractions and shoulder rolls (A/P) with reduced ROM to avoid inc in pain   Rationale: increase ROM, increase strength and increase proprioception to improve the patients ability to perform functional mobility and improve activity  endurance     24 min Manual Therapy:  STM to UT/LS, teres major, pect, and lat; (R) S/L (L) scap mobs; grade II GHJ mobs for pain relief; PROM flexion, scaption, [email or web address], gentle oscillations to reduce guarding   Rationale: decrease pain, increase ROM, increase tissue extensibility, decrease trigger points and increase postural awareness to allow for progression to AAROM and maintain mm flexibility. X min Patient Education: [x] Review HEP; reviewed post-surgical precautions as patient seen holding onto cell phone with (L) hand     Other Objective/Functional Measures:    Sleep quality improved. Shoulder PROM: flexion/scaption 145 deg    Pain Level (0-10 scale) post treatment: 0    ASSESSMENT/Changes in Function:   Patient has increasing passive mobility into flexion and scaption; cont gentle, slow progression into ER limited to ~10deg by min ERP. Reassessment due NV for PN and PT cont - patient currently 3 weeks, 2 days post-op, limited to PROM by MD protocol. Patient will continue to benefit from skilled PT services to modify and progress therapeutic interventions, address functional mobility deficits, address ROM deficits, address strength deficits, analyze and address soft tissue restrictions, analyze and cue movement patterns, analyze and modify body mechanics/ergonomics and assess and modify postural abnormalities to attain remaining goals. [x]  See Plan of Care  []  See progress note/recertification  []  See Discharge Summary         Progress towards goals / Updated goals:  Short Term Goals: To be accomplished in  6  weeks:  1. Patient to be adherent to protocol precautions to ensure safety and proper healing - goal met - compliant with protocol  7/10/17  2. Patient to achieve PROM L shoulder flexion of > 130 degrees in order to prepare for AROM to reach overhead objects. -Goal met; flexion PROM 145 deg (7/14/17)  3. Patient to achieve PROM L shoulder abduction of > 130 degrees in order to prepare for AROM to reach objects to the side. -Goal met; scaption PROM 145 deg (7/14/17)  Long Term Goals: To be accomplished in  12  weeks:  1. Patient to be Independent with HEP to self-manage/prevent symptoms after DC. 2. Patient to achieve AROM L shoulder flexion of 140 degrees in order to facilitate ability to perform overhead activities and lifting light objects on top shelf  3. Patient to achieve AROM L shoulder abduction of 140 degrees in order to facilitate ability to perform washing/brushing of hair  4. Patient to improve FOTO score to > stage 4 to indicate increased functional independence.      PLAN  [x]  Upgrade activities as tolerated     [x]  Continue plan of care  []  Update interventions per flow sheet       []  Discharge due to:_  [x]  Other: assess goals NV for PT cont, 2-3x for 8-10 treatments; cont per MD protocol    Orin Grant, PTA  7/14/2017

## 2017-07-17 ENCOUNTER — HOSPITAL ENCOUNTER (OUTPATIENT)
Dept: PHYSICAL THERAPY | Age: 67
Discharge: HOME | End: 2017-07-17
Payer: MEDICARE

## 2017-07-17 PROCEDURE — G8984 CARRY CURRENT STATUS: HCPCS

## 2017-07-17 PROCEDURE — G8985 CARRY GOAL STATUS: HCPCS

## 2017-07-17 PROCEDURE — 97016 VASOPNEUMATIC DEVICE THERAPY: CPT

## 2017-07-17 PROCEDURE — 97140 MANUAL THERAPY 1/> REGIONS: CPT

## 2017-07-17 NOTE — PROGRESS NOTES
[address] [facility] [address] [facility] AT [address]. [facility] [address]  Phone: [phone] Fax: [phone]  PROGRESS NOTE  Patient Name: Juliet Sanchez : 1950   Treatment/Medical Diagnosis: Left shoulder pain [M25.512]   Referral Source: Parker Talbot MD     Date of Initial Visit: 17; DOS 17 Attended Visits: 10 Missed Visits: 0     SUMMARY OF TREATMENT  Patient is a 78 y.o. female s/p L SAD RCR, DOS 17. Treatment has consisted of review of precautions and self-care, don/doffing sling, sleeping positions; Ther ex including distal UE ROM and strengthening, flexibility, scapular stabilization; manual therapy including: PROM GHJ, scapula, TrP release as needed; Patient education; HEP, cryotherapy for edema and pain control   CURRENT STATUS  Pt is making good progress in therapy. Con't to follow protocol which is limited to PROM phase at this time. Pain ranges from 2-4/10, ave 3/10 described as \"achy\", and pt rates 50% improvement. Score on the FOTO has improved from 32/100 at IE to 42/100. Steri strips intact in 2 anterior locations, no sxs infection, scope sites healing appropriately. Functional improvements: don/doff sling, getting dressed, sleeping comfortably in sidelying, walking without discomfort  Deficits: reaching, AROM L GHJ per protocol, sleeping supine, driving    PROM L GHJ: flexion 145, extension 30, Er at 0 30, scaption 140, IR at 45 25, ER at 45 30  Elbow AROM 100%  Wrist AROM 100%  Scapular mobility: PROM is full all planes, AROM improving with retraction and depression     Short Term Goals: To be accomplished in  6  weeks:  1. Patient to be adherent to protocol precautions to ensure safety and proper healing - goal met - compliant with protocol  7/10/17  2. Patient to achieve PROM L shoulder flexion of > 130 degrees in order to prepare for AROM to reach overhead objects. -Goal met; flexion PROM 145 deg (17)  3.  Patient to achieve PROM L shoulder abduction of > 130 degrees in order to prepare for AROM to reach objects to the side. -Goal met; scaption PROM 145 deg (7/14/17)  Long Term Goals: To be accomplished in  12  weeks:  1. Patient to be Independent with HEP to self-manage/prevent symptoms after DC. Con't to progress HEP as able per protocol limitations (7/17/17)  2. Patient to achieve AROM L shoulder flexion of 140 degrees in order to facilitate ability to perform overhead activities and lifting light objects on top shelf Pt con't in PROM phase per MD protocol. Will progress to AAROM at 6 weeks post-op (7/17/17)  3. Patient to achieve AROM L shoulder abduction of 140 degrees in order to facilitate ability to perform washing/brushing of hair Pt con't in PROM phase per MD protocol. Will progress to AAROM at 6 weeks post-op (7/17/17)  4. Patient to improve FOTO score to > stage 4 to indicate increased functional independence. Goal progressing at 42/100 (7/17/17)      New Goals to be achieved in __8-10__  treatments:  1. Patient to achieve AROM L shoulder flexion of 140 degrees in order to facilitate ability to perform overhead activities and lifting light objects on top shelf    2. Patient to achieve AROM L shoulder abduction of 140 degrees in order to facilitate ability to perform washing/brushing of hair    3. Patient to improve FOTO score to > stage 4 to indicate increased functional independence   4. Pt will have an increase in MMT of the L GHJ and scapular stabilizers to > or = 4/5 all planes to improve lifting, reaching, carrying      G-Codes: Carry  Current  CK= 40-59%    Goal  CJ= 20-39%. The severity rating is based on the FOTO Score  RECOMMENDATIONS  Recommend co'nt with PT interventions, 2-3x/week for 8-10 sessions, abiding by MD protocol with MD f/u as scheduled. Thank you    If you have any questions/comments please contact us directly at [phone].    Thank you for allowing us to assist in the care of your patient. Therapist Signature: Ne Bernal DPT Date: 7/17/2017     Time: 7:47 AM   NOTE TO PHYSICIAN:  PLEASE COMPLETE THE ORDERS BELOW AND FAX TO   [facility] at [facility]: [phone]. If you are unable to process this request in 24 hours please contact our office: [phone].  ___ I have read the above report and request that my patient continue as recommended.   ___ I have read the above report and request that my patient continue therapy with the following changes/special instructions:_________________________________________________________   ___ I have read the above report and request that my patient be discharged from therapy.      Physician Signature:        Date:       Time:

## 2017-07-17 NOTE — PROGRESS NOTES
PT DAILY TREATMENT NOTE     Patient Name: Pedro Fee  Date:2017  : 1950  [x]  Patient  Verified  Payor: [place] MED[place] / Plan: [place] MEDICARE PART [place] & B / Product Type: Medicare /    In time:9:01  Out time:9:50  Total Treatment Time (min): 49  Total Timed Codes (min): 39  1:1 Treatment Time (min): 29   Visit #: 10 of 8-10    Treatment Area: Left shoulder pain [M25.512]    SUBJECTIVE  Pain Level (0-10 scale): 3  Any medication changes, allergies to medications, adverse drug reactions, diagnosis change, or new procedure performed?: [x] No    [] Yes (see summary sheet for update)  Subjective functional status/changes:   [] No changes reported  Ave pain 3/10, (B) 2/10 \"achy\", (W): 4/10  Functional improvements: don/doff sling, getting dressed, sleeping comfortably in sidelying, walking without discomfort  Deficits: reaching, AROM L GHJ per protocol, sleeping supine, driving  42% improvement     OBJECTIVE  Modality rationale: decrease edema, decrease inflammation and decrease pain to improve the patients ability to improve comfort with sleeping, ADLs    Min Type Additional Details    [] Estim: []Att   []Unatt        []TENS instruct                  []IFC  []Premod   []NMES                     []Other:  []w/US   []w/ice   []w/heat  Position:  Location:    []  Traction: [] Cervical       []Lumbar                       [] Prone          []Supine                       []Intermittent   []Continuous Lbs:  [] before manual  [] after manual    []  Ultrasound: []Continuous   [] Pulsed                           []1MHz   []3MHz Location:  W/cm2:    []  Iontophoresis with dexamethasone         Location: [] Take home patch   [] In clinic    []  Ice     []  heat  []  Ice massage Position:  Location:   10 [x]  Vasopneumatic Device - Sitting Pressure:       [] lo [x] med [] hi   Temperature: [x] lo [] med [] hi   [x] Skin assessment post-treatment:  [x]intact []redness- no adverse reaction       []redness  adverse reaction:       14 (NC) min Therapeutic Exercise:  [x] See flow sheet : final 3 TE performed with VASO due to increased time needed for PN today    Rationale: increase ROM and increase strength to improve the patients ability to improve reaching, ADLs, dressing     25 min Manual Therapy:  DTM to L UT, posterior cuff; PROM L GHJ extension, ER and IR at 45, scaption, abduction, flexion; scapular mobs D1 and D2 flexion/extension. Reassessment for PN to MD    Rationale: decrease pain, increase ROM and increase tissue extensibility to improve prognosis for AROM phase           x min Patient Education: [x] Review HEP    [] Progressed/Changed HEP based on:     Reviewed con't PROM precautions, use of the sling, coming out of the sling for HEP; sleeping positions  Reviewed what's coming [] positioning   [] body mechanics   [] transfers   [] heat/ice application        Other Objective/Functional Measures:   PROM L GHJ: flexion 145, extension 30, Er at 0 30, scaption 140 (145 best), IR at 45 25, ER at 45 30  Elbow AROM 100%  Wrist AROM 100%  MMT Wrist 5/5, elbow 5/5  FOTO 42/100 (was 32/100)    Pain Level (0-10 scale) post treatment: 0    ASSESSMENT/Changes in Function: see PN. Patient will continue to benefit from skilled PT services to modify and progress therapeutic interventions, address functional mobility deficits, address ROM deficits, address strength deficits, analyze and address soft tissue restrictions, analyze and cue movement patterns, analyze and modify body mechanics/ergonomics, assess and modify postural abnormalities and instruct in home and community integration to attain remaining goals. []  See Plan of Care  [x]  See progress note/recertification   []  See Discharge Summary         Progress towards goals / Updated goals:  Short Term Goals: To be accomplished in  6  weeks:  1.  Patient to be adherent to protocol precautions to ensure safety and proper healing - goal met - compliant with protocol 7/10/17  2. Patient to achieve PROM L shoulder flexion of > 130 degrees in order to prepare for AROM to reach overhead objects. -Goal met; flexion PROM 145 deg (7/14/17)  3. Patient to achieve PROM L shoulder abduction of > 130 degrees in order to prepare for AROM to reach objects to the side. -Goal met; scaption PROM 145 deg (7/14/17)  Long Term Goals: To be accomplished in  12  weeks:  1. Patient to be Independent with HEP to self-manage/prevent symptoms after DC. Con't to progress HEP as able per protocol limitations (7/17/17)  2. Patient to achieve AROM L shoulder flexion of 140 degrees in order to facilitate ability to perform overhead activities and lifting light objects on top shelf Pt con't in PROM phase per MD protocol. Will progress to AAROM at 6 weeks post-op (7/17/17)  3. Patient to achieve AROM L shoulder abduction of 140 degrees in order to facilitate ability to perform washing/brushing of hair Pt con't in PROM phase per MD protocol. Will progress to AAROM at 6 weeks post-op (7/17/17)  4. Patient to improve FOTO score to > stage 4 to indicate increased functional independence. PLAN  []  Upgrade activities as tolerated     [x]  Continue plan of care  [x]  Update interventions per flow sheet       []  Discharge due to:_  [x]  Other:_  Please add PROM cane ER at 39 NV if pt can perform and con't to use PROM.      Bobbetta Paget, PT 7/17/2017  7:42 AM

## 2017-07-19 ENCOUNTER — HOSPITAL ENCOUNTER (OUTPATIENT)
Dept: PHYSICAL THERAPY | Age: 67
Discharge: HOME | End: 2017-07-19
Payer: MEDICARE

## 2017-07-19 PROCEDURE — 97016 VASOPNEUMATIC DEVICE THERAPY: CPT

## 2017-07-19 PROCEDURE — 97110 THERAPEUTIC EXERCISES: CPT

## 2017-07-19 PROCEDURE — 97140 MANUAL THERAPY 1/> REGIONS: CPT

## 2017-07-19 NOTE — PROGRESS NOTES
PT DAILY TREATMENT NOTE     Patient Name: Shad Barreto  Date:2017  : 1950  [x]  Patient  Verified  Payor: Mary Flynn / Plan: [place] MEDICARE PART A & B / Product Type: Medicare /    In time:826 Out time: 929  Total Treatment Time (min): 63  Total Timed Codes (min): 53  1:1 Treatment Time (min): 53   Visit #: 1 of 8-10    Treatment Area: Left shoulder pain [M25.512]    SUBJECTIVE  Pain Level (0-10 scale): 2  Any medication changes, allergies to medications, adverse drug reactions, diagnosis change, or new procedure performed?: [x] No    [] Yes (see summary sheet for update)  Subjective functional status/changes:   [] No changes reported  \"Just a little sore today. \"    OBJECTIVE  Modality rationale: decrease edema, decrease inflammation and decrease pain to improve the patients ability to improve comfort with sleeping, ADLs    Min Type Additional Details    [] Estim: []Att   []Unatt        []TENS instruct                  []IFC  []Premod   []NMES                     []Other:  []w/US   []w/ice   []w/heat  Position:  Location:    []  Traction: [] Cervical       []Lumbar                       [] Prone          []Supine                       []Intermittent   []Continuous Lbs:  [] before manual  [] after manual    []  Ultrasound: []Continuous   [] Pulsed                           []1MHz   []3MHz Location:  W/cm2:    []  Iontophoresis with dexamethasone         Location: [] Take home patch   [] In clinic    []  Ice     []  heat  []  Ice massage Position:  Location:   10 [x]  Vasopneumatic Device - Sitting Pressure:       [] lo [x] med [] hi   Temperature: [x] lo [] med [] hi   [x] Skin assessment post-treatment:  [x]intact []redness- no adverse reaction       []redness  adverse reaction:       30 min Therapeutic Exercise:  [x] See flow sheet :    Rationale: increase ROM and increase strength to improve the patients ability to improve reaching, ADLs, dressing     23 min Manual Therapy:  PROM flexion, ER, scaption, scar massage, joint oscillation mobs, SL extension and scap mobs sup/ inf and medial lateral    Rationale: decrease pain, increase ROM and increase tissue extensibility to improve prognosis for AROM phase           x min Patient Education: [x] Review HEP      Other Objective/Functional Measures: Added supine cane ER at 45 deg for improved GH mobility    Moderate scar adhesions    Flexion table slide 142 deg    Pain Level (0-10 scale) post treatment: 0    ASSESSMENT/Changes in Function: Patient is exactly 4 weeks post op today. 2 more weeks of PROM per MD protocol, then can progress to AAROM at 6 weeks (8/2/17). Moderate pain with scap elevation likely sec to guarding, improved with relaxation. Good response to cane ER with VCing for form and to avoid pain extremes. Able to maintain PROM    Patient will continue to benefit from skilled PT services to modify and progress therapeutic interventions, address functional mobility deficits, address ROM deficits, address strength deficits, analyze and address soft tissue restrictions, analyze and cue movement patterns, analyze and modify body mechanics/ergonomics, assess and modify postural abnormalities and instruct in home and community integration to attain remaining goals. []  See Plan of Care  [x]  See progress note/recertification 8/00/44  []  See Discharge Summary         Progress towards goals / Updated goals: PN complete last session - cont per updated goals 7/19/17  1. Patient to achieve AROM L shoulder flexion of 140 degrees in order to facilitate ability to perform overhead activities and lifting light objects on top shelf    2.  Patient to achieve AROM L shoulder abduction of 140 degrees in order to facilitate ability to perform washing/brushing of hair    3. Patient to improve FOTO score to > stage 4 to indicate increased functional independence   4.   Pt will have an increase in MMT of the L GHJ and scapular stabilizers to > or = 4/5 all planes to improve lifting, reaching, carrying        PLAN  []  Upgrade activities as tolerated     [x]  Continue plan of care  [x]  Update interventions per flow sheet       []  Discharge due to:_  [x]  Other: add cane ER to [address] [facility], PT 7/19/2017

## 2017-07-21 ENCOUNTER — HOSPITAL ENCOUNTER (OUTPATIENT)
Dept: PHYSICAL THERAPY | Age: 67
Discharge: HOME | End: 2017-07-21
Payer: MEDICARE

## 2017-07-21 PROCEDURE — 97140 MANUAL THERAPY 1/> REGIONS: CPT

## 2017-07-21 PROCEDURE — 97016 VASOPNEUMATIC DEVICE THERAPY: CPT

## 2017-07-21 NOTE — PROGRESS NOTES
PT DAILY TREATMENT NOTE     Patient Name: Yvonne Tuttle  Date:2017  : 1950  [x]  Patient  Verified  Payor: Julius Chadwick / Plan: [place] MEDICARE PART A & B / Product Type: Medicare /    In time: 9:00am      Out time:  9:50am  Total Treatment Time (min): 50  Total Timed Codes (min): 40  1:1 Treatment Time (min): 38  Visit #: 2 of 8-10    Treatment Area: Left shoulder pain [M25.512]    SUBJECTIVE  Pain Level (0-10 scale): 2  Any medication changes, allergies to medications, adverse drug reactions, diagnosis change, or new procedure performed?: [x] No    [] Yes (see summary sheet for update)  Subjective functional status/changes:   [] No changes reported  \"The scar is tender. \"    OBJECTIVE  Modality rationale: decrease edema, decrease inflammation and decrease pain to improve the patients ability to improve comfort with sleeping, ADLs    Min Type Additional Details    [] Estim: []Att   []Unatt        []TENS instruct                  []IFC  []Premod   []NMES                     []Other:  []w/US   []w/ice   []w/heat  Position:  Location:    []  Traction: [] Cervical       []Lumbar                       [] Prone          []Supine                       []Intermittent   []Continuous Lbs:  [] before manual  [] after manual    []  Ultrasound: []Continuous   [] Pulsed                           []1MHz   []3MHz Location:  W/cm2:    []  Iontophoresis with dexamethasone         Location: [] Take home patch   [] In clinic    []  Ice     []  heat  []  Ice massage Position:  Location:   10 [x]  Vasopneumatic Device - Sitting Pressure:       [] lo [x] med [] hi   Temperature: [x] lo [] med [] hi   [x] Skin assessment post-treatment:  [x]intact []redness- no adverse reaction       []redness  adverse reaction:       14 min Therapeutic Exercise:  [x] See flow sheet:    Rationale: increase ROM and increase strength to improve the patients ability to improve reaching, ADLs, dressing     26 min Manual Therapy:  RONALDO stein; PROM flexion, ER, scaption; scar massage; joint oscillation mobs, SL extension and scap mobs sup/inf and medial lateral, STM to teres group and mid-deltoid   Rationale: decrease pain, increase ROM and increase tissue extensibility to improve prognosis for AROM phase           X min Patient Education: [x] Review HEP      Other Objective/Functional Measures:    PROM flexion ~160 deg, ER ~40deg   HEP compliance: excellent    Pain Level (0-10 scale) post treatment: 0    ASSESSMENT/Changes in Function:   Improved passive mobility today into flexion and scaption. Limitations into ER noted to ~40 deg today. Patient will continue to benefit from skilled PT services to modify and progress therapeutic interventions, address functional mobility deficits, address ROM deficits, address strength deficits, analyze and address soft tissue restrictions, analyze and cue movement patterns, analyze and modify body mechanics/ergonomics, assess and modify postural abnormalities and instruct in home and community integration to attain remaining goals. []  See Plan of Care  [x]  See progress note/recertification 6/47/56  []  See Discharge Summary         Progress towards goals / Updated goals: PN complete last session - cont per updated goals 7/19/17  1. Patient to achieve AROM L shoulder flexion of 140 degrees in order to facilitate ability to perform overhead activities and lifting light objects on top shelf    2.  Patient to achieve AROM L shoulder abduction of 140 degrees in order to facilitate ability to perform washing/brushing of hair    3. Patient to improve FOTO score to > stage 4 to indicate increased functional independence   4.   Pt will have an increase in MMT of the L GHJ and scapular stabilizers to > or = 4/5 all planes to improve lifting, reaching, carrying        PLAN  [x]  Upgrade activities as tolerated     [x]  Continue plan of care  [x]  Update interventions per flow sheet       []  Discharge due to:_  [x] Other: cont per MD protocol, PROM, scapular therex    Keya Mention, PTA 7/21/2017

## 2017-07-23 NOTE — PROGRESS NOTES
PT DAILY TREATMENT NOTE     Patient Name: Ocie Lefort  Date:2017  : 1950  [x]  Patient  Verified  Payor: Crispin Vasques / Plan: [place] MEDICARE PART A & B / Product Type: Medicare /    In time: 900      Out time:  955  Total Treatment Time (min): 55  Total Timed Codes (min): 45   1:1 Treatment Time (min): 45  Visit #: 3 of 8-10    Treatment Area: Left shoulder pain [M25.512]    SUBJECTIVE  Pain Level (0-10 scale):  2  Any medication changes, allergies to medications, adverse drug reactions, diagnosis change, or new procedure performed?: [x] No    [] Yes (see summary sheet for update)  Subjective functional status/changes:   [] No changes reported  \"I was out this weekend and I was exhausted \"    OBJECTIVE  Modality rationale: decrease edema, decrease inflammation and decrease pain to improve the patients ability to improve comfort with sleeping, ADLs    Min Type Additional Details    [] Estim: []Att   []Unatt        []TENS instruct                  []IFC  []Premod   []NMES                     []Other:  []w/US   []w/ice   []w/heat  Position:  Location:    []  Traction: [] Cervical       []Lumbar                       [] Prone          []Supine                       []Intermittent   []Continuous Lbs:  [] before manual  [] after manual    []  Ultrasound: []Continuous   [] Pulsed                           []1MHz   []3MHz Location:  W/cm2:    []  Iontophoresis with dexamethasone         Location: [] Take home patch   [] In clinic    []  Ice     []  heat  []  Ice massage Position:  Location:   10 [x]  Vasopneumatic Device - Sitting Pressure:       [] lo [x] med [] hi   Temperature: [x] lo [] med [] hi   [x] Skin assessment post-treatment:  [x]intact []redness- no adverse reaction       []redness  adverse reaction:       21 min Therapeutic Exercise:  [x] See flow sheet:    Rationale: increase ROM and increase strength to improve the patients ability to improve reaching, ADLs, dressing     24 min Manual Therapy:  GHJ mobs; PROM flexion, ER, scaption; scar massage; joint oscillation mobs, SL extension and scap mobs sup/inf and medial lateral   Rationale: decrease pain, increase ROM and increase tissue extensibility to improve prognosis for AROM phase           X min Patient Education: [x] Review HEP      Other Objective/Functional Measures:    ER PROM : 22 with cane     Pain Level (0-10 scale) post treatment: 0    ASSESSMENT/Changes in Function: Patient presents with good progression of PROM. Medial scap has decreased tone - WNL post surgery. Patient feels scap retractions are no longer challenging - will progress scap strengthening NV. Cont VASO for post manual soreness     Patient will continue to benefit from skilled PT services to modify and progress therapeutic interventions, address functional mobility deficits, address ROM deficits, address strength deficits, analyze and address soft tissue restrictions, analyze and cue movement patterns, analyze and modify body mechanics/ergonomics, assess and modify postural abnormalities and instruct in home and community integration to attain remaining goals. []  See Plan of Care  [x]  See progress note/recertification 7/92/52  []  See Discharge Summary         Progress towards goals / Updated goals: All goals reviewed and progressing appropriately as of 7/24/2017  1. Patient to achieve AROM L shoulder flexion of 140 degrees in order to facilitate ability to perform overhead activities and lifting light objects on top shelf  - unable to initiate until 6 weeks post op 7/23/17   2. Patient to achieve AROM L shoulder abduction of 140 degrees in order to facilitate ability to perform washing/brushing of hair unable to initiate until 6 weeks post op 7/23/17    3. Patient to improve FOTO score to > stage 4 to indicate increased functional independence   4.   Pt will have an increase in MMT of the L GHJ and scapular stabilizers to > or = 4/5 all planes to improve lifting, reaching, carrying        PLAN  [x]  Upgrade activities as tolerated     [x]  Continue plan of care  [x]  Update interventions per flow sheet       []  Discharge due to:_  [x]  Other: add 1/2 prone scap retraction     Lilia Murillo, PT

## 2017-07-24 ENCOUNTER — HOSPITAL ENCOUNTER (OUTPATIENT)
Dept: PHYSICAL THERAPY | Age: 67
Discharge: HOME | End: 2017-07-24
Payer: MEDICARE

## 2017-07-24 PROCEDURE — 97140 MANUAL THERAPY 1/> REGIONS: CPT

## 2017-07-24 PROCEDURE — 97110 THERAPEUTIC EXERCISES: CPT

## 2017-07-24 PROCEDURE — 97016 VASOPNEUMATIC DEVICE THERAPY: CPT

## 2017-07-25 NOTE — PROGRESS NOTES
PT DAILY TREATMENT NOTE     Patient Name: Lannette Aschoff  Date:2017  : 1950  [x]  Patient  Verified  Payor: Kellie  / Plan: [place] / Product Type: Medicare /    In time: 190 Out time:  927  Total Treatment Time (min): 57  Total Timed Codes (min): 47  1:1 Treatment Time (min): 845 - 650 (32)  Visit #: 4 of 8-10    Treatment Area: Left shoulder pain [M25.512]    SUBJECTIVE  Pain Level (0-10 scale):  2  Any medication changes, allergies to medications, adverse drug reactions, diagnosis change, or new procedure performed?: [x] No    [] Yes (see summary sheet for update)  Subjective functional status/changes:   [] No changes reported  \"Doing ok\"  No new co     OBJECTIVE  Modality rationale: decrease edema, decrease inflammation and decrease pain to improve the patients ability to improve comfort with sleeping, ADLs    Min Type Additional Details    [] Estim: []Att   []Unatt        []TENS instruct                  []IFC  []Premod   []NMES                     []Other:  []w/US   []w/ice   []w/heat  Position:  Location:    []  Traction: [] Cervical       []Lumbar                       [] Prone          []Supine                       []Intermittent   []Continuous Lbs:  [] before manual  [] after manual    []  Ultrasound: []Continuous   [] Pulsed                           []1MHz   []3MHz Location:  W/cm2:    []  Iontophoresis with dexamethasone         Location: [] Take home patch   [] In clinic    []  Ice     []  heat  []  Ice massage Position:  Location:   10 [x]  Vasopneumatic Device - Sitting Pressure:       [] lo [x] med [] hi   Temperature: [x] lo [] med [] hi   [x] Skin assessment post-treatment:  [x]intact []redness- no adverse reaction       []redness  adverse reaction:       32 min Therapeutic Exercise:  [x] See flow sheet:    Rationale: increase ROM and increase strength to improve the patients ability to improve reaching, ADLs, dressing     15 min Manual Therapy:  RONALDO stein; PROM flexion, ER, scaption; scar massage; joint oscillation mobs, SL extension and scap mobs sup/inf and medial lateral   Rationale: decrease pain, increase ROM and increase tissue extensibility to improve prognosis for AROM phase           X min Patient Education: [x] Review HEP- added prone scap therex       Other Objective/Functional Measures: Added prone scap retraction EOB to address scap strength without compromising RTC repair    PROM ER approx 51 deg     Pain Level (0-10 scale) post treatment: 0    ASSESSMENT/Changes in Function: Patient tolerated new therex well with 100% VCing for form. Pain with SL shoulder ADD sec to stress on RTC, therefore held. Updated HEP . Patient aware up MD reassessment PN and will schedule more apts into AUG     Patient will continue to benefit from skilled PT services to modify and progress therapeutic interventions, address functional mobility deficits, address ROM deficits, address strength deficits, analyze and address soft tissue restrictions, analyze and cue movement patterns, analyze and modify body mechanics/ergonomics, assess and modify postural abnormalities and instruct in home and community integration to attain remaining goals. []  See Plan of Care  [x]  See progress note/recertification 9/47/53  []  See Discharge Summary         Progress towards goals / Updated goals: All goals reviewed and progressing appropriately as of 7/25/2017  1. Patient to achieve AROM L shoulder flexion of 140 degrees in order to facilitate ability to perform overhead activities and lifting light objects on top shelf  - unable to initiate until 6 weeks post op 7/23/17   2. Patient to achieve AROM L shoulder abduction of 140 degrees in order to facilitate ability to perform washing/brushing of hair unable to initiate until 6 weeks post op 7/23/17    3. Patient to improve FOTO score to > stage 4 to indicate increased functional independence   4.   Pt will have an increase in MMT of the L GHJ and scapular stabilizers to > or = 4/5 all planes to improve lifting, reaching, carrying        PLAN  [x]  Upgrade activities as tolerated     [x]  Continue plan of care  [x]  Update interventions per flow sheet       []  Discharge due to:_  [x]  Other: abbreviated PN (full PN complete 7/17) due NV prior to return to MD Linda Smith, PT

## 2017-07-26 ENCOUNTER — HOSPITAL ENCOUNTER (OUTPATIENT)
Dept: PHYSICAL THERAPY | Age: 67
Discharge: HOME | End: 2017-07-26
Payer: MEDICARE

## 2017-07-26 PROCEDURE — 97016 VASOPNEUMATIC DEVICE THERAPY: CPT

## 2017-07-26 PROCEDURE — 97140 MANUAL THERAPY 1/> REGIONS: CPT

## 2017-07-26 PROCEDURE — 97110 THERAPEUTIC EXERCISES: CPT

## 2017-07-28 ENCOUNTER — HOSPITAL ENCOUNTER (OUTPATIENT)
Dept: PHYSICAL THERAPY | Age: 67
Discharge: HOME | End: 2017-07-28
Payer: MEDICARE

## 2017-07-28 PROCEDURE — G8985 CARRY GOAL STATUS: HCPCS

## 2017-07-28 PROCEDURE — 97140 MANUAL THERAPY 1/> REGIONS: CPT

## 2017-07-28 PROCEDURE — G8984 CARRY CURRENT STATUS: HCPCS

## 2017-07-28 PROCEDURE — 97016 VASOPNEUMATIC DEVICE THERAPY: CPT

## 2017-07-28 PROCEDURE — 97110 THERAPEUTIC EXERCISES: CPT

## 2017-07-28 NOTE — PROGRESS NOTES
PT DAILY TREATMENT NOTE     Patient Name: Leslie Sandres  Date:2017  : 1950  [x]  Patient  Verified  Payor: Elsy Chen / Plan: [place] MED[place]ARE [place] & [place] / Product Type: Medicare /    In time: 9:32am        Out time: 10:40am  Total Treatment Time (min): 68  Total Timed Codes (min): 58  1:1 Treatment Time (min): 31  Visit #: 5 of 8-10    Treatment Area: Left shoulder pain [M25.512]    SUBJECTIVE  Pain Level (0-10 scale): 2  Any medication changes, allergies to medications, adverse drug reactions, diagnosis change, or new procedure performed?: [x] No    [] Yes (see summary sheet for update)  Subjective functional status/changes:   [] No changes reported  \"I still have that same pain, it's more like a dull ache in the shoulder. \"    OBJECTIVE  Modality rationale: decrease edema, decrease inflammation and decrease pain to improve the patients ability to improve comfort with sleeping, ADLs    Min Type Additional Details    [] Estim: []Att   []Unatt        []TENS instruct                  []IFC  []Premod   []NMES                     []Other:  []w/US   []w/ice   []w/heat  Position:  Location:    []  Traction: [] Cervical       []Lumbar                       [] Prone          []Supine                       []Intermittent   []Continuous Lbs:  [] before manual  [] after manual    []  Ultrasound: []Continuous   [] Pulsed                           []1MHz   []3MHz Location:  W/cm2:    []  Iontophoresis with dexamethasone         Location: [] Take home patch   [] In clinic    []  Ice     []  heat  []  Ice massage Position:  Location:   10 [x]  Vasopneumatic Device - sitting Pressure:       [] lo [x] med [] hi   Temperature: [x] lo [] med [] hi   [x] Skin assessment post-treatment:  [x]intact []redness- no adverse reaction       []redness  adverse reaction:       42 min Therapeutic Exercise:  [x] See flow sheet:    Rationale: increase ROM and increase strength to improve the patients ability to improve reaching, ADLs, dressing     16 min Manual Therapy:  GHJ mobs; PROM flexion, ER, scaption; scar massage; joint oscillation mobs, SL extension and scap mobs sup/inf and medial lateral   Rationale: decrease pain, increase ROM and increase tissue extensibility to improve prognosis for AROM phase           X min Patient Education: [x] Review HEP - added prone scap therex       Other Objective/Functional Measures:    (L) shoulder PROM: flex 158 deg, ext 49 deg, [email or web address] 50 deg, [email or web address] 40 deg   Pain: (A) 2    Pain Level (0-10 scale) post treatment: 0    ASSESSMENT/Changes in Function:  Slowly improving passive mobility. Cont'd tightness into ER/IR, but able to improve with manual - patient noting consistency with sling use at all times which most likely contributing to tightness. Patient will be seeing MD next week; will resend new PROM measurements along with 7/17/17 PN. Patient will continue to benefit from skilled PT services to modify and progress therapeutic interventions, address functional mobility deficits, address ROM deficits, address strength deficits, analyze and address soft tissue restrictions, analyze and cue movement patterns, analyze and modify body mechanics/ergonomics, assess and modify postural abnormalities and instruct in home and community integration to attain remaining goals. []  See Plan of Care  [x]  See progress note/recertification (3/73/92)   []  See Discharge Summary         Progress towards goals / Updated goals: All goals reviewed and progressing appropriately as of 7/28/2017  1. Patient to achieve AROM L shoulder flexion of 140 degrees in order to facilitate ability to perform overhead activities and lifting light objects on top shelf  - unable to initiate until 6 weeks post op 7/23/17   2. Patient to achieve AROM L shoulder abduction of 140 degrees in order to facilitate ability to perform washing/brushing of hair unable to initiate until 6 weeks post op 7/23/17    3.   Patient to improve FOTO score to > stage 4 to indicate increased functional independence   4.   Pt will have an increase in MMT of the L GHJ and scapular stabilizers to > or = 4/5 all planes to improve lifting, reaching, carrying        PLAN  [x]  Upgrade activities as tolerated     [x]  Continue plan of care  [x]  Update interventions per flow sheet       []  Discharge due to:_  [x]  Other: inquire about MD changes to program, sling use, etc.    Jose Romero, PTA

## 2017-08-01 NOTE — PROGRESS NOTES
[address] [facility] AT [address]. [facility] [address]  Phone: [phone] Fax: [phone]  PROGRESS NOTE  Patient Name: Piper Iglesias : 1950   Treatment/Medical Diagnosis: Left shoulder pain [M25.512]   Referral Source: Eagle Bennett MD     Date of Initial Visit: 17; DOS 17 Attended Visits: 10 Missed Visits: 0     SUMMARY OF TREATMENT  Patient is a 78 y.o. female s/p L SAD RCR, DOS 17. Treatment has consisted of review of precautions and self-care, don/doffing sling, sleeping positions; Ther ex including distal UE ROM and strengthening, flexibility, scapular stabilization; manual therapy including: PROM GHJ, scapula, TrP release as needed; Patient education; HEP, cryotherapy for edema and pain control. CURRENT STATUS  Pt is making good progress in therapy. Con't to follow protocol which is limited to PROM phase at this time. Pain ranges from 2-4/10, ave 3/10 described as \"achy\", and pt rates 50% improvement. Score on the FOTO has improved from 32/100 at IE to 42/100. Steri strips intact in 2 anterior locations, no sxs infection, scope sites healing appropriately. Functional improvements: don/doff sling, getting dressed, sleeping comfortably in sidelying, walking without discomfort  Deficits: reaching, AROM L GHJ per protocol, sleeping supine, driving    (L) shoulder PROM, updated from 17: flex 158 deg, ext 49 deg, [email or web address] 50 deg, [email or web address] 40 deg  Pain: (A) 2  Elbow AROM 100%  Wrist AROM 100%  Scapular mobility: PROM is full all planes, AROM improving with retraction and depression     Short Term Goals: To be accomplished in  6  weeks:  1. Patient to be adherent to protocol precautions to ensure safety and proper healing - goal met - compliant with protocol  7/10/17  2. Patient to achieve PROM L shoulder flexion of > 130 degrees in order to prepare for AROM to reach overhead objects.  -Goal met; flexion PROM 145 deg (7/14/17)  3. Patient to achieve PROM L shoulder abduction of > 130 degrees in order to prepare for AROM to reach objects to the side. -Goal met; scaption PROM 145 deg (7/14/17)  Long Term Goals: To be accomplished in  12  weeks:  1. Patient to be Independent with HEP to self-manage/prevent symptoms after DC. Con't to progress HEP as able per protocol limitations (7/17/17)  2. Patient to achieve AROM L shoulder flexion of 140 degrees in order to facilitate ability to perform overhead activities and lifting light objects on top shelf Pt con't in PROM phase per MD protocol. Will progress to AAROM at 6 weeks post-op (7/17/17)  3. Patient to achieve AROM L shoulder abduction of 140 degrees in order to facilitate ability to perform washing/brushing of hair Pt con't in PROM phase per MD protocol. Will progress to AAROM at 6 weeks post-op (7/17/17)  4. Patient to improve FOTO score to > stage 4 to indicate increased functional independence. Goal progressing at 42/100 (7/17/17)    New Goals to be achieved in __8-10__  treatments:  1. Patient to achieve AROM L shoulder flexion of 140 degrees in order to facilitate ability to perform overhead activities and lifting light objects on top shelf    2. Patient to achieve AROM L shoulder abduction of 140 degrees in order to facilitate ability to perform washing/brushing of hair    3. Patient to improve FOTO score to > stage 4 to indicate increased functional independence   4. Pt will have an increase in MMT of the L GHJ and scapular stabilizers to > or = 4/5 all planes to improve lifting, reaching, carrying      G-Codes: Carry  Current  CK= 40-59%    Goal  CJ= 20-39%. The severity rating is based on the FOTO Score     RECOMMENDATIONS  Recommend co'nt with PT interventions, 2-3x/week for 8-10 sessions, abiding by MD protocol with MD f/u as scheduled. Thank you! If you have any questions/comments please contact us directly at [phone].    Thank you for allowing us to assist in the care of your patient. PTA Signature: Ha Crowley PTA Date: 8/1/2017   PT Signature: Chin Vergara DPKHADAR  Time: 7:47 AM   NOTE TO PHYSICIAN:  PLEASE COMPLETE THE ORDERS BELOW AND FAX TO   [facility]: [phone]. If you are unable to process this request in 24 hours please contact our office: [phone].  ___ I have read the above report and request that my patient continue as recommended.   ___ I have read the above report and request that my patient continue therapy with the following changes/special instructions:_________________________________________________________   ___ I have read the above report and request that my patient be discharged from therapy.      Physician Signature:        Date:       Time:

## 2017-08-02 ENCOUNTER — HOSPITAL ENCOUNTER (OUTPATIENT)
Dept: PHYSICAL THERAPY | Age: 67
Discharge: HOME | End: 2017-08-02
Payer: MEDICARE

## 2017-08-02 PROCEDURE — 97140 MANUAL THERAPY 1/> REGIONS: CPT

## 2017-08-02 PROCEDURE — 97016 VASOPNEUMATIC DEVICE THERAPY: CPT

## 2017-08-02 NOTE — PROGRESS NOTES
PT DAILY TREATMENT NOTE     Patient Name: Piper Iglesias  Date:2017  : 1950  [x]  Patient  Verified  Payor: Ramila Raciel / Plan: [place] MEDICARE PART A & B / Product Type: Medicare /    In time: 11:01am        Out time: 12:15pm  Total Treatment Time (min): 74  Total Timed Codes (min): 64  1:1 Treatment Time (min): 30  Visit #: 1 of 8-10    Treatment Area: Left shoulder pain [M25.512]    SUBJECTIVE  Pain Level (0-10 scale): 2  Any medication changes, allergies to medications, adverse drug reactions, diagnosis change, or new procedure performed?: [x] No    [] Yes (see summary sheet for update)  Subjective functional status/changes:   [] No changes reported  Patient currently 6 weeks post-op, able to progress to [facility] per MD protocol. \"The doctor was pleased, she said to take off the sling, but gradually like you reiterated. \"    OBJECTIVE  Modality rationale: decrease edema, decrease inflammation and decrease pain to improve the patients ability to improve comfort with sleeping, ADLs    Min Type Additional Details    [] Estim: []Att   []Unatt        []TENS instruct                  []IFC  []Premod   []NMES                     []Other:  []w/US   []w/ice   []w/heat  Position:  Location:    []  Traction: [] Cervical       []Lumbar                       [] Prone          []Supine                       []Intermittent   []Continuous Lbs:  [] before manual  [] after manual    []  Ultrasound: []Continuous   [] Pulsed                           []1MHz   []3MHz Location:  W/cm2:    []  Iontophoresis with dexamethasone         Location: [] Take home patch   [] In clinic    []  Ice     []  heat  []  Ice massage Position:  Location:   10 [x]  Vasopneumatic Device - sitting Pressure:       [] lo [x] med [] hi   Temperature: [x] lo [] med [] hi   [x] Skin assessment post-treatment:  [x]intact []redness- no adverse reaction       []redness  adverse reaction:       34 min Therapeutic Exercise:  [x] See flow sheet: added fingerladder, cane flexion and ER, and pulleys   Rationale: increase ROM and increase strength to improve the patients ability to improve reaching, ADLs, dressing     30 min Manual Therapy:  GHJ mobs; PROM flexion, ER, scaption; scar massage; joint oscillation mobs, SL extension and scap mobs sup/inf and medial lateral   Rationale: decrease pain, increase ROM and increase tissue extensibility to improve prognosis for AROM phase           X min Patient Education: [x] Review HEP; discussed gradual sling weaning and expectations sec decreased passive support     Other Objective/Functional Measures:     Pain Level (0-10 scale) post treatment: 0    ASSESSMENT/Changes in Function:  Good tolerance to initiation of AAROM. Significant fatigue noted with AAROM cane flexion in supine, therefore, limited to 5 repetitions. Patient will continue to benefit from skilled PT services to modify and progress therapeutic interventions, address functional mobility deficits, address ROM deficits, address strength deficits, analyze and address soft tissue restrictions, analyze and cue movement patterns, analyze and modify body mechanics/ergonomics, assess and modify postural abnormalities and instruct in home and community integration to attain remaining goals. []  See Plan of Care  [x]  See progress note/recertification (7/33/02)   []  See Discharge Summary         Progress towards goals / Updated goals: All goals reviewed and progressing appropriately as of 8/2/2017  1. Patient to achieve AROM L shoulder flexion of 140 degrees in order to facilitate ability to perform overhead activities and lifting light objects on top shelf  - unable to initiate until 6 weeks post op 7/23/17   2. Patient to achieve AROM L shoulder abduction of 140 degrees in order to facilitate ability to perform washing/brushing of hair unable to initiate until 6 weeks post op 7/23/17    3.   Patient to improve FOTO score to > stage 4 to indicate increased functional independence   4.   Pt will have an increase in MMT of the L GHJ and scapular stabilizers to > or = 4/5 all planes to improve lifting, reaching, carrying        PLAN  [x]  Upgrade activities as tolerated     [x]  Continue plan of care  [x]  Update interventions per flow sheet       []  Discharge due to:_  [x]  Other: assess AROM and consider addition of gentle rhythmic stabs to manual    Lira Mutters, PTA 8/2/17

## 2017-08-04 ENCOUNTER — HOSPITAL ENCOUNTER (OUTPATIENT)
Dept: PHYSICAL THERAPY | Age: 67
Discharge: HOME | End: 2017-08-04
Payer: MEDICARE

## 2017-08-04 PROCEDURE — 97140 MANUAL THERAPY 1/> REGIONS: CPT

## 2017-08-04 PROCEDURE — 97110 THERAPEUTIC EXERCISES: CPT

## 2017-08-04 NOTE — PROGRESS NOTES
PT DAILY TREATMENT NOTE     Patient Name: Reynaldo Romero  Date:2017  : 1950  [x]  Patient  Verified  Payor: [place] MEDICARE / Plan: [place] MEDICARE PART A & B / Product Type: Medicare /    In time: 9:30am        Out time: 10:28am  Total Treatment Time (min): 58  Total Timed Codes (min): 48  1:1 Treatment Time (min): 24  Visit #: 2 of 8-10    Treatment Area: Left shoulder pain [M25.512]    SUBJECTIVE  Pain Level (0-10 scale): 3-4  Any medication changes, allergies to medications, adverse drug reactions, diagnosis change, or new procedure performed?: [x] No    [] Yes (see summary sheet for update)  Subjective functional status/changes:   [] No changes reported  \"I am sore from being out of the sling. \"    OBJECTIVE  Modality rationale: decrease edema, decrease inflammation and decrease pain to improve the patients ability to improve comfort with sleeping, ADLs    Min Type Additional Details    [] Estim: []Att   []Unatt        []TENS instruct                  []IFC  []Premod   []NMES                     []Other:  []w/US   []w/ice   []w/heat  Position:  Location:    []  Traction: [] Cervical       []Lumbar                       [] Prone          []Supine                       []Intermittent   []Continuous Lbs:  [] before manual  [] after manual    []  Ultrasound: []Continuous   [] Pulsed                           []1MHz   []3MHz Location:  W/cm2:    []  Iontophoresis with dexamethasone         Location: [] Take home patch   [] In clinic    []  Ice     []  heat  []  Ice massage Position:  Location:   10 [x]  Vasopneumatic Device - sitting Pressure:       [] lo [x] med [] hi   Temperature: [x] lo [] med [] hi   [x] Skin assessment post-treatment:  [x]intact []redness- no adverse reaction       []redness  adverse reaction:       33 min Therapeutic Exercise:  [x] See flow sheet: no changes sec DOMS   Rationale: increase ROM and increase strength to improve the patients ability to improve reaching, ADLs, dressing     15 min Manual Therapy:  GHJ mobs; PROM flexion, IR, ER, scaption; scar massage; joint oscillation mobs, SL extension and scap mobs sup/inf and medial lateral, STM biceps and deltoid   Rationale: decrease pain, increase ROM and increase tissue extensibility to improve prognosis for AROM phase           X min Patient Education: [x] Review HEP - added cane flexion and ER (photos declined)     Other Objective/Functional Measures:     Pain Level (0-10 scale) post treatment: 0 - \"frozen\"    ASSESSMENT/Changes in Function:  Patient reporting DOMS with progression into AAROM. Notable inc in tissue density along biceps and deltoid slowly improving. Patient will continue to benefit from skilled PT services to modify and progress therapeutic interventions, address functional mobility deficits, address ROM deficits, address strength deficits, analyze and address soft tissue restrictions, analyze and cue movement patterns, analyze and modify body mechanics/ergonomics, assess and modify postural abnormalities and instruct in home and community integration to attain remaining goals. []  See Plan of Care  [x]  See progress note/recertification (2/98/72)   []  See Discharge Summary         Progress towards goals / Updated goals: All goals reviewed and progressing appropriately as of 8/4/2017  1. Patient to achieve AROM L shoulder flexion of 140 degrees in order to facilitate ability to perform overhead activities and lifting light objects on top shelf  - unable to initiate until 6 weeks post op 7/23/17   2. Patient to achieve AROM L shoulder abduction of 140 degrees in order to facilitate ability to perform washing/brushing of hair unable to initiate until 6 weeks post op 7/23/17    3. Patient to improve FOTO score to > stage 4 to indicate increased functional independence   4.   Pt will have an increase in MMT of the L GHJ and scapular stabilizers to > or = 4/5 all planes to improve lifting, reaching, carrying        PLAN  [x]  Upgrade activities as tolerated     [x]  Continue plan of care  [x]  Update interventions per flow sheet       []  Discharge due to:_  [x]  Other: assess AROM and consider addition of gentle rhythmic stabs to manual    Fran Santos, JACKELYN 8/4/17

## 2017-08-08 ENCOUNTER — HOSPITAL ENCOUNTER (OUTPATIENT)
Dept: PHYSICAL THERAPY | Age: 67
Discharge: HOME | End: 2017-08-08
Payer: MEDICARE

## 2017-08-08 PROCEDURE — 97110 THERAPEUTIC EXERCISES: CPT | Performed by: PHYSICAL THERAPIST

## 2017-08-08 PROCEDURE — 97016 VASOPNEUMATIC DEVICE THERAPY: CPT | Performed by: PHYSICAL THERAPIST

## 2017-08-08 PROCEDURE — 97140 MANUAL THERAPY 1/> REGIONS: CPT | Performed by: PHYSICAL THERAPIST

## 2017-08-08 NOTE — PROGRESS NOTES
PT DAILY TREATMENT NOTE     Patient Name: Leslie Sanders  Date:2017  : 1950  [x]  Patient  Verified  Payor: Elsy Chen / Plan: [place] / Product Type: Medicare /    In time: 1200am        Out time: 105 pm  Total Treatment Time (min): 65  Total Timed Codes (min): 60  1:1 Treatment Time (min): 50  Visit #: 3 of 8-10    Treatment Area: Left shoulder pain [M25.512]    SUBJECTIVE  Pain Level (0-10 scale): 2  Any medication changes, allergies to medications, adverse drug reactions, diagnosis change, or new procedure performed?: [x] No    [] Yes (see summary sheet for update)  Subjective functional status/changes:   [] No changes reported  \"I feel better when I am out of the sling. I still just have that deltoid soreness. Michaelyn Daily \"    OBJECTIVE  Modality rationale: decrease edema, decrease inflammation and decrease pain to improve the patients ability to improve comfort with sleeping, ADLs    Min Type Additional Details    [] Estim: []Att   []Unatt        []TENS instruct                  []IFC  []Premod   []NMES                     []Other:  []w/US   []w/ice   []w/heat  Position:  Location:    []  Traction: [] Cervical       []Lumbar                       [] Prone          []Supine                       []Intermittent   []Continuous Lbs:  [] before manual  [] after manual    []  Ultrasound: []Continuous   [] Pulsed                           []1MHz   []3MHz Location:  W/cm2:    []  Iontophoresis with dexamethasone         Location: [] Take home patch   [] In clinic    []  Ice     []  heat  []  Ice massage Position:  Location:   10 [x]  Vasopneumatic Device - sitting Pressure:       [] lo [x] med [] hi   Temperature: [x] lo [] med [] hi   [x] Skin assessment post-treatment:  [x]intact []redness- no adverse reaction       []redness  adverse reaction:       40/35 min Therapeutic Exercise:  [x] See flow sheet: added SA punches   Rationale: increase ROM and increase strength to improve the patients ability to improve reaching, ADLs, dressing     15 min Manual Therapy:  GHJ mobs; PROM flexion, IR, ER, scaption; scar massage; joint oscillation mobs, SL extension and scap mobs sup/inf and medial lateral, STM biceps and deltoid   Rationale: decrease pain, increase ROM and increase tissue extensibility to improve prognosis for AROM phase           X min Patient Education: [x] Review HEP - added cane flexion and ER (photos declined)     Other Objective/Functional Measures: Increased winging noted on L scapular border, AAROM: 148deg flexion, added SA punches with good tolerance (in supine), multiple angle isometrics performed to decrease mm guarding with PROM IR/ER, patient continues with L UT and LS TrP. TTP in mid scapular border mm. Instructed in  Positioning for L UE when sitting and standing to facilitate decreased stress and increased postural awareness. Progress to rhythmic stabs nv    Pain Level (0-10 scale) post treatment: 0    ASSESSMENT/Changes in Function:  Progressing with scapular strengthening, instructed pt on performing stretches to L LS and UT 2x/day to help with TRP and decrease mm tension. Patient will continue to benefit from skilled PT services to modify and progress therapeutic interventions, address functional mobility deficits, address ROM deficits, address strength deficits, analyze and address soft tissue restrictions, analyze and cue movement patterns, analyze and modify body mechanics/ergonomics, assess and modify postural abnormalities and instruct in home and community integration to attain remaining goals. []  See Plan of Care  [x]  See progress note/recertification (3/41/36)   []  See Discharge Summary         Progress towards goals / Updated goals: All goals reviewed and progressing appropriately as of 8/8/2017  1.    Patient to achieve AROM L shoulder flexion of 140 degrees in order to facilitate ability to perform overhead activities and lifting light objects on top shelf  - unable to initiate until 6 weeks post op 7/23/17   2. Patient to achieve AROM L shoulder abduction of 140 degrees in order to facilitate ability to perform washing/brushing of hair unable to initiate until 6 weeks post op 7/23/17    3. Patient to improve FOTO score to > stage 4 to indicate increased functional independence   4.   Pt will have an increase in MMT of the L GHJ and scapular stabilizers to > or = 4/5 all planes to improve lifting, reaching, carrying        PLAN  [x]  Upgrade activities as tolerated     [x]  Continue plan of care  [x]  Update interventions per flow sheet       []  Discharge due to:_  [x]  Other: assess AROM and consider addition of gentle rhythmic stabs to manual    Chestine January, PT 8/4/17

## 2017-08-11 ENCOUNTER — HOSPITAL ENCOUNTER (OUTPATIENT)
Dept: PHYSICAL THERAPY | Age: 67
Discharge: HOME | End: 2017-08-11
Payer: MEDICARE

## 2017-08-11 PROCEDURE — 97016 VASOPNEUMATIC DEVICE THERAPY: CPT

## 2017-08-11 PROCEDURE — 97110 THERAPEUTIC EXERCISES: CPT

## 2017-08-11 PROCEDURE — 97140 MANUAL THERAPY 1/> REGIONS: CPT

## 2017-08-11 NOTE — PROGRESS NOTES
PT DAILY TREATMENT NOTE     Patient Name: Yvonne Tuttle  Date:2017  : 1950  [x]  Patient  Verified  Payor: Jluius Chadwick / Plan: [place] PART [place] / Product Type: Medicare /    In time: 8:31am        Out time: 9:56am  Total Treatment Time (min): 85  Total Timed Codes (min): 75  1:1 Treatment Time (min): 40  Visit #: 4 of 8-10    Treatment Area: Left shoulder pain [M25.512]    SUBJECTIVE  Pain Level (0-10 scale): 2  Any medication changes, allergies to medications, adverse drug reactions, diagnosis change, or new procedure performed?: [x] No    [] Yes (see summary sheet for update)  Subjective functional status/changes:   [] No changes reported  \"I think I turned a corner with my rehab. \"    OBJECTIVE  Modality rationale: decrease edema, decrease inflammation and decrease pain to improve the patients ability to improve comfort with sleeping, ADLs    Min Type Additional Details    [] Estim: []Att   []Unatt        []TENS instruct                  []IFC  []Premod   []NMES                     []Other:  []w/US   []w/ice   []w/heat  Position:  Location:    []  Traction: [] Cervical       []Lumbar                       [] Prone          []Supine                       []Intermittent   []Continuous Lbs:  [] before manual  [] after manual    []  Ultrasound: []Continuous   [] Pulsed                           []1MHz   []3MHz Location:  W/cm2:    []  Iontophoresis with dexamethasone         Location: [] Take home patch   [] In clinic    []  Ice     []  heat  []  Ice massage Position:  Location:   10 [x]  Vasopneumatic Device - sitting Pressure:       [] lo [x] med [] hi   Temperature: [x] lo [] med [] hi   [x] Skin assessment post-treatment:  [x]intact []redness- no adverse reaction       []redness  adverse reaction:       44 min Therapeutic Exercise:  [x] See flow sheet:    Rationale: increase ROM and increase strength to improve the patients ability to improve reaching, ADLs, dressing     31 min Manual Therapy:  GHJ mobs f/b PROM; gentle rhythmic stabs at 90 deg flexion   Rationale: decrease pain, increase ROM and increase tissue extensibility to improve prognosis for AROM phase           X min Patient Education: [x] Review HEP - added cane flexion and ER (photos declined)     Other Objective/Functional Measures:    Pain Level (0-10 scale) post treatment: 0    ASSESSMENT/Changes in Function:  Slowly developing scapular control with rhythmic stabs addition to manual interventions today. Patient able to achieve ~60 deg ER PROM at 45deg ABD following mobilization. Patient will continue to benefit from skilled PT services to modify and progress therapeutic interventions, address functional mobility deficits, address ROM deficits, address strength deficits, analyze and address soft tissue restrictions, analyze and cue movement patterns, analyze and modify body mechanics/ergonomics, assess and modify postural abnormalities and instruct in home and community integration to attain remaining goals. []  See Plan of Care  [x]  See progress note/recertification (3/54/05)   []  See Discharge Summary         Progress towards goals / Updated goals: All goals reviewed and progressing appropriately as of 8/11/2017  1. Patient to achieve AROM L shoulder flexion of 140 degrees in order to facilitate ability to perform overhead activities and lifting light objects on top shelf  - unable to initiate until 6 weeks post op 7/23/17   2. Patient to achieve AROM L shoulder abduction of 140 degrees in order to facilitate ability to perform washing/brushing of hair unable to initiate until 6 weeks post op 7/23/17    3. Patient to improve FOTO score to > stage 4 to indicate increased functional independence   4.   Pt will have an increase in MMT of the L GHJ and scapular stabilizers to > or = 4/5 all planes to improve lifting, reaching, carrying        PLAN  [x]  Upgrade activities as tolerated     [x]  Continue plan of care  [x] Update interventions per flow sheet       []  Discharge due to:_  [x]  Other: progress to AROM as tolerated next week    Trinidad Leal, PTA 8/11/17

## 2017-08-15 ENCOUNTER — HOSPITAL ENCOUNTER (OUTPATIENT)
Dept: PHYSICAL THERAPY | Age: 67
Discharge: HOME | End: 2017-08-15
Payer: MEDICARE

## 2017-08-15 PROCEDURE — 97110 THERAPEUTIC EXERCISES: CPT

## 2017-08-15 PROCEDURE — 97140 MANUAL THERAPY 1/> REGIONS: CPT

## 2017-08-15 NOTE — PROGRESS NOTES
PT DAILY TREATMENT NOTE     Patient Name: Juliet Sanchez  Date:8/15/2017  : 1950  [x]  Patient  Verified  Payor: [place] / Plan: [place] MED[place]ARE PART A & B / Product Type: Medicare /    In time: 10:02am        Out time: 11:00am  Total Treatment Time (min): 58  Total Timed Codes (min): 48  1:1 Treatment Time (min): 40  Visit #: 5 of 8-10    Treatment Area: Left shoulder pain [M25.512]    SUBJECTIVE  Pain Level (0-10 scale): 3  Any medication changes, allergies to medications, adverse drug reactions, diagnosis change, or new procedure performed?: [x] No    [] Yes (see summary sheet for update)  Subjective functional status/changes:   [] No changes reported  \"I can't get in a position of comfort. \"    OBJECTIVE  Modality rationale: decrease pain and increase tissue extensibility to improve the patients ability to improve comfort with sleeping, ADLs    Min Type Additional Details    [] Estim: []Att   []Unatt        []TENS instruct                  []IFC  []Premod   []NMES                     []Other:  []w/US   []w/ice   []w/heat  Position:  Location:    []  Traction: [] Cervical       []Lumbar                       [] Prone          []Supine                       []Intermittent   []Continuous Lbs:  [] before manual  [] after manual    []  Ultrasound: []Continuous   [] Pulsed                           []1MHz   []3MHz Location:  W/cm2:    []  Iontophoresis with dexamethasone         Location: [] Take home patch   [] In clinic   10 []  Ice     [x]  heat  []  Ice massage Position: seated  Location: (L) UT    []  Vasopneumatic Device - sitting Pressure:       [] lo [x] med [] hi   Temperature: [x] lo [] med [] hi   [x] Skin assessment post-treatment:  [x]intact []redness- no adverse reaction       []redness  adverse reaction:     24 min Therapeutic Exercise:  [x] See flow sheet: progressing to AROM sec patient achieving 8 weeks post-op tomorrow   Rationale: increase ROM and increase strength to improve the patients ability to improve reaching, ADLs, dressing     24 min Manual Therapy:  TPR to (L) UT and rhomboids; scap mobs, GHJ mobs f/b PROM; gentle rhythmic stabs at 90 deg flexion   Rationale: decrease pain, increase ROM and increase tissue extensibility to improve prognosis for AROM phase           X min Patient Education: [x] Review HEP - modified to table stretch, shoulder flexion to 90 degrees, SA punches, and S/L ER to parallel to floor     Other Objective/Functional Measures:    Pain Level (0-10 scale) post treatment: 0    ASSESSMENT/Changes in Function:  Significant UT tightness today released well with manual interventions with decreased pain to 0/10 - pt notes out of sling for last couple of days and has been increasing use of the shoulder. Encouraged pt to avoid shoulder elevation > 90 degrees at this time sec reduced RTC stability. Improved scapular stability with rhythmic stabs. Patient will continue to benefit from skilled PT services to modify and progress therapeutic interventions, address functional mobility deficits, address ROM deficits, address strength deficits, analyze and address soft tissue restrictions, analyze and cue movement patterns, analyze and modify body mechanics/ergonomics, assess and modify postural abnormalities and instruct in home and community integration to attain remaining goals. []  See Plan of Care  [x]  See progress note/recertification (8/90/50)   []  See Discharge Summary         Progress towards goals / Updated goals: All goals reviewed and progressing appropriately as of 8/15/2017  1. Patient to achieve AROM L shoulder flexion of 140 degrees in order to facilitate ability to perform overhead activities and lifting light objects on top shelf  - unable to initiate until 6 weeks post op 7/23/17   2.   Patient to achieve AROM L shoulder abduction of 140 degrees in order to facilitate ability to perform washing/brushing of hair unable to initiate until 6 weeks post op 7/23/17    3. Patient to improve FOTO score to > stage 4 to indicate increased functional independence   4.   Pt will have an increase in MMT of the L GHJ and scapular stabilizers to > or = 4/5 all planes to improve lifting, reaching, carrying        PLAN  [x]  Upgrade activities as tolerated     [x]  Continue plan of care  [x]  Update interventions per flow sheet       []  Discharge due to:_  [x]  Other: assess response to progressed HEP and encourage proper C/S posture to reduce UT tightness    Blake Hernandez, JACKELYN 8/15/2017

## 2017-08-18 ENCOUNTER — HOSPITAL ENCOUNTER (OUTPATIENT)
Dept: PHYSICAL THERAPY | Age: 67
Discharge: HOME | End: 2017-08-18
Payer: MEDICARE

## 2017-08-18 PROCEDURE — 97140 MANUAL THERAPY 1/> REGIONS: CPT

## 2017-08-18 NOTE — PROGRESS NOTES
PT DAILY TREATMENT NOTE     Patient Name: Jason Foss  Date:2017  : 1950  [x]  Patient  Verified  Payor: Mike Clay / Plan: [place] PART [place] / Product Type: Medicare /    In time: 10:01am        Out time: 11:20am  Total Treatment Time (min): 79  Total Timed Codes (min): 69  1:1 Treatment Time (min): 37  Visit #: 6 of 8-10    Treatment Area: Left shoulder pain [M25.512]    SUBJECTIVE  Pain Level (0-10 scale): 2  Any medication changes, allergies to medications, adverse drug reactions, diagnosis change, or new procedure performed?: [x] No    [] Yes (see summary sheet for update)  Subjective functional status/changes:   [] No changes reported  \"It's sore. \"    OBJECTIVE  Modality rationale: decrease pain and increase tissue extensibility to improve the patients ability to improve comfort with sleeping, ADLs    Min Type Additional Details    [] Estim: []Att   []Unatt        []TENS instruct                  []IFC  []Premod   []NMES                     []Other:  []w/US   []w/ice   []w/heat  Position:  Location:    []  Traction: [] Cervical       []Lumbar                       [] Prone          []Supine                       []Intermittent   []Continuous Lbs:  [] before manual  [] after manual    []  Ultrasound: []Continuous   [] Pulsed                           []1MHz   []3MHz Location:  W/cm2:    []  Iontophoresis with dexamethasone         Location: [] Take home patch   [] In clinic   10 []  Ice     [x]  heat  []  Ice massage Position: seated  Location: (L) UT    []  Vasopneumatic Device - sitting Pressure:       [] lo [x] med [] hi   Temperature: [x] lo [] med [] hi   [x] Skin assessment post-treatment:  [x]intact []redness- no adverse reaction       []redness  adverse reaction:     32 min Therapeutic Exercise:  [x] See flow sheet: progressing to AROM sec patient achieving 8 weeks post-op tomorrow   Rationale: increase ROM and increase strength to improve the patients ability to improve reaching, ADLs, dressing     37 min Manual Therapy:  TPR to (L) UT, rhomboids, lat; scap mobs, GHJ mobs f/b PROM; gentle rhythmic stabs at 90 deg flexion   Rationale: decrease pain, increase ROM and increase tissue extensibility to improve prognosis for AROM phase           X min Patient Education: [x] Review HEP - modified to table stretch, shoulder flexion to 90 degrees, SA punches, and S/L ER to parallel to floor     Other Objective/Functional Measures:    Pain Level (0-10 scale) post treatment: 0    ASSESSMENT/Changes in Function:  Patient progressing appropriately. Cont'd discomfort at anterolateral shoulder alleviated with TPR to (L) UT. Patient will continue to benefit from skilled PT services to modify and progress therapeutic interventions, address functional mobility deficits, address ROM deficits, address strength deficits, analyze and address soft tissue restrictions, analyze and cue movement patterns, analyze and modify body mechanics/ergonomics, assess and modify postural abnormalities and instruct in home and community integration to attain remaining goals. []  See Plan of Care  [x]  See progress note/recertification (5/17/47)   []  See Discharge Summary         Progress towards goals / Updated goals: All goals reviewed and progressing appropriately as of 8/18/2017  1. Patient to achieve AROM L shoulder flexion of 140 degrees in order to facilitate ability to perform overhead activities and lifting light objects on top shelf  - unable to initiate until 6 weeks post op 7/23/17   2. Patient to achieve AROM L shoulder abduction of 140 degrees in order to facilitate ability to perform washing/brushing of hair unable to initiate until 6 weeks post op 7/23/17    3. Patient to improve FOTO score to > stage 4 to indicate increased functional independence   4.   Pt will have an increase in MMT of the L GHJ and scapular stabilizers to > or = 4/5 all planes to improve lifting, reaching, carrying PLAN  [x]  Upgrade activities as tolerated     [x]  Continue plan of care  [x]  Update interventions per flow sheet       []  Discharge due to:_  [x]  Other: address lat tightness    Ross Payne, JACKELYN 8/18/2017

## 2017-08-22 ENCOUNTER — HOSPITAL ENCOUNTER (OUTPATIENT)
Dept: PHYSICAL THERAPY | Age: 67
Discharge: HOME | End: 2017-08-22
Payer: MEDICARE

## 2017-08-22 PROCEDURE — 97140 MANUAL THERAPY 1/> REGIONS: CPT

## 2017-08-22 NOTE — PROGRESS NOTES
PT DAILY TREATMENT NOTE     Patient Name: Juliet Sanchez  Date:2017  : 1950  [x]  Patient  Verified  Payor: Fox Fraction / Plan: [place]ARE PART A [employer] [place] / Product Type: Medicare /    In time: 10:31am        Out time: 11:33am  Total Treatment Time (min): 62  Total Timed Codes (min): 52  1:1 Treatment Time (min): 30  Visit #: 7 of 8-10    Treatment Area: Left shoulder pain [M25.512]    SUBJECTIVE  Pain Level (0-10 scale): 2  Any medication changes, allergies to medications, adverse drug reactions, diagnosis change, or new procedure performed?: [x] No    [] Yes (see summary sheet for update)  Subjective functional status/changes:   [] No changes reported  \"I am just tight. I have been using my arm more. \"    OBJECTIVE  Modality rationale: decrease pain and increase tissue extensibility to improve the patients ability to improve comfort with sleeping, ADLs    Min Type Additional Details    [] Estim: []Att   []Unatt        []TENS instruct                  []IFC  []Premod   []NMES                     []Other:  []w/US   []w/ice   []w/heat  Position:  Location:    []  Traction: [] Cervical       []Lumbar                       [] Prone          []Supine                       []Intermittent   []Continuous Lbs:  [] before manual  [] after manual    []  Ultrasound: []Continuous   [] Pulsed                           []1MHz   []3MHz Location:  W/cm2:    []  Iontophoresis with dexamethasone         Location: [] Take home patch   [] In clinic   10 []  Ice     [x]  heat  []  Ice massage Position: seated  Location: (L) UT    []  Vasopneumatic Device - sitting Pressure:       [] lo [x] med [] hi   Temperature: [x] lo [] med [] hi   [x] Skin assessment post-treatment:  [x]intact []redness- no adverse reaction       []redness  adverse reaction:     23 min Therapeutic Exercise:  [x] See flow sheet: added doorway ER stretch and biceps stretch, progressed shoulder x 4   Rationale: increase ROM and increase strength to improve the patients ability to improve reaching, ADLs, dressing     29 min Manual Therapy:  TPR to (L) anterior deltoid, GHJ mobs f/b PROM; gentle rhythmic stabs at 90 deg flexion   Rationale: decrease pain, increase ROM and increase tissue extensibility to improve prognosis for AROM phase           X min Patient Education: [x] Modified HEP     Other Objective/Functional Measures:   (L) shoulder AROM: flex 120 deg, [email or web address]    Pain Level (0-10 scale) post treatment: 0    ASSESSMENT/Changes in Function:  Patient demo slowly improving mobility in all directions. Cont'd limitations into ER, therefore, encouraged avoidance of OH reaching to avoid impingement. Pt responding well to scap stabs in supine. Patient will continue to benefit from skilled PT services to modify and progress therapeutic interventions, address functional mobility deficits, address ROM deficits, address strength deficits, analyze and address soft tissue restrictions, analyze and cue movement patterns, analyze and modify body mechanics/ergonomics, assess and modify postural abnormalities and instruct in home and community integration to attain remaining goals. []  See Plan of Care  [x]  See progress note/recertification (5/77/05)   []  See Discharge Summary         Progress towards goals / Updated goals: All goals reviewed and progressing appropriately as of 8/22/2017  1. Patient to achieve AROM L shoulder flexion of 140 degrees in order to facilitate ability to perform overhead activities and lifting light objects on top shelf  -Goal progressing; 125 deg shoulder flexion AROM with minimal UT substitutions (8/22/17)   2. Patient to achieve AROM L shoulder abduction of 140 degrees in order to facilitate ability to perform washing/brushing of hair unable to initiate until 6 weeks post op 7/23/17    3. Patient to improve FOTO score to > stage 4 to indicate increased functional independence   4.   Pt will have an increase in MMT of the L GHJ and scapular stabilizers to > or = 4/5 all planes to improve lifting, reaching, carrying      PLAN  [x]  Upgrade activities as tolerated     [x]  Continue plan of care  [x]  Update interventions per flow sheet       []  Discharge due to:_  [x]  Other: progress ROM/strengthening, cont to reduce manual to encourage (I)    Corrina Bill, PTA 8/22/2017

## 2017-08-25 ENCOUNTER — HOSPITAL ENCOUNTER (OUTPATIENT)
Dept: PHYSICAL THERAPY | Age: 67
Discharge: HOME | End: 2017-08-25
Payer: MEDICARE

## 2017-08-25 PROCEDURE — 97140 MANUAL THERAPY 1/> REGIONS: CPT

## 2017-08-25 NOTE — PROGRESS NOTES
PT DAILY TREATMENT NOTE     Patient Name: Lili Pride  Date:2017  : 1950  [x]  Patient  Verified  Payor: Sergo Varela / Plan: [place] / Product Type: Medicare /    In time: 10:30am        Out time: 11:36am  Total Treatment Time (min): 66  Total Timed Codes (min): 56  1:1 Treatment Time (min): 32  Visit #: 8 of 8-10    Treatment Area: Left shoulder pain [M25.512]    SUBJECTIVE  Pain Level (0-10 scale): 2  Any medication changes, allergies to medications, adverse drug reactions, diagnosis change, or new procedure performed?: [x] No    [] Yes (see summary sheet for update)  Subjective functional status/changes:   [] No changes reported  \"I mowed the grass the other day and felt fine. \"    OBJECTIVE  Modality rationale: decrease pain and increase tissue extensibility to improve the patients ability to improve comfort with sleeping, ADLs    Min Type Additional Details    [] Estim: []Att   []Unatt        []TENS instruct                  []IFC  []Premod   []NMES                     []Other:  []w/US   []w/ice   []w/heat  Position:  Location:    []  Traction: [] Cervical       []Lumbar                       [] Prone          []Supine                       []Intermittent   []Continuous Lbs:  [] before manual  [] after manual    []  Ultrasound: []Continuous   [] Pulsed                           []1MHz   []3MHz Location:  W/cm2:    []  Iontophoresis with dexamethasone         Location: [] Take home patch   [] In clinic   10 []  Ice     [x]  heat  []  Ice massage Position: seated  Location: (L) UT    []  Vasopneumatic Device - sitting Pressure:       [] lo [x] med [] hi   Temperature: [x] lo [] med [] hi   [x] Skin assessment post-treatment:  [x]intact []redness- no adverse reaction       []redness  adverse reaction:     30 min Therapeutic Exercise:  [x] See flow sheet: added wall pushup   Rationale: increase ROM and increase strength to improve the patients ability to improve reaching, ADLs, dressing     26 min Manual Therapy:  grade IV (L) GHJ mobs f/b PROM, scap stabs 2x30\"   Rationale: decrease pain, increase ROM and increase tissue extensibility to improve prognosis for AROM phase           X min Patient Education: [x] Modified HEP     Other Objective/Functional Measures:    [email or web address] PROM: ~55 deg     Pain Level (0-10 scale) post treatment: 0    ASSESSMENT/Changes in Function:  Improved mobility into ER following grade IV GHJ mobs. Patient will continue to benefit from skilled PT services to modify and progress therapeutic interventions, address functional mobility deficits, address ROM deficits, address strength deficits, analyze and address soft tissue restrictions, analyze and cue movement patterns, analyze and modify body mechanics/ergonomics, assess and modify postural abnormalities and instruct in home and community integration to attain remaining goals. []  See Plan of Care  [x]  See progress note/recertification (3/01/04)   []  See Discharge Summary         Progress towards goals / Updated goals: All goals reviewed and progressing appropriately as of 8/25/2017  1. Patient to achieve AROM L shoulder flexion of 140 degrees in order to facilitate ability to perform overhead activities and lifting light objects on top shelf  -Goal progressing; 125 deg shoulder flexion AROM with minimal UT substitutions (8/22/17)   2. Patient to achieve AROM L shoulder abduction of 140 degrees in order to facilitate ability to perform washing/brushing of hair unable to initiate until 6 weeks post op 7/23/17    3. Patient to improve FOTO score to > stage 4 to indicate increased functional independence   4.   Pt will have an increase in MMT of the L GHJ and scapular stabilizers to > or = 4/5 all planes to improve lifting, reaching, carrying      PLAN  [x]  Upgrade activities as tolerated     [x]  Continue plan of care  [x]  Update interventions per flow sheet       []  Discharge due to:_  [x] Other: progress ROM/strengthening, cont to reduce manual to encourage (I)    Keya Peraza, PTA 8/25/2017

## 2017-08-28 NOTE — PROGRESS NOTES
PT DAILY TREATMENT NOTE     Patient Name: Chela Haro  Date:2017  : 1950  [x]  Patient  Verified  Payor: Barbara Castellanos / Plan: [place] MEDICARE PART A & B / Product Type: Medicare /    In time: 10:00am        Out time: 11:20am  Total Treatment Time (min): 80  Total Timed Codes (min): 70  1:1 Treatment Time (min): 45  Visit #: 9 of 8-10    Treatment Area: Left shoulder pain [M25.512]    SUBJECTIVE  Pain Level (0-10 scale): 2  Any medication changes, allergies to medications, adverse drug reactions, diagnosis change, or new procedure performed?: [x] No    [] Yes (see summary sheet for update)  Subjective functional status/changes:   [] No changes reported  \"I am back to my normal stuff, but I avoid reaching in certain directions. \"    OBJECTIVE  Modality rationale: decrease pain and increase tissue extensibility to improve the patients ability to improve comfort with sleeping, ADLs    Min Type Additional Details    [] Estim: []Att   []Unatt        []TENS instruct                  []IFC  []Premod   []NMES                     []Other:  []w/US   []w/ice   []w/heat  Position:  Location:    []  Traction: [] Cervical       []Lumbar                       [] Prone          []Supine                       []Intermittent   []Continuous Lbs:  [] before manual  [] after manual    []  Ultrasound: []Continuous   [] Pulsed                           []1MHz   []3MHz Location:  W/cm2:    []  Iontophoresis with dexamethasone         Location: [] Take home patch   [] In clinic   10 []  Ice     [x]  heat  []  Ice massage Position: seated  Location: (L) UT    []  Vasopneumatic Device - sitting Pressure:       [] lo [x] med [] hi   Temperature: [x] lo [] med [] hi   [x] Skin assessment post-treatment:  [x]intact []redness- no adverse reaction       []redness  adverse reaction:     50 min Therapeutic Exercise:  [x] See flow sheet:   Rationale: increase ROM and increase strength to improve the patients ability to improve reaching, ADLs, dressing     20 min Manual Therapy:  grade IV (L) GHJ mobs f/b PROM   Rationale: decrease pain, increase ROM and increase tissue extensibility to improve prognosis for AROM phase           X min Patient Education: [x] Modified HEP     Other Objective/Functional Measures:    Shoulder AROM: flex 122deg, ext 40deg, abd 108deg, [email or web address] 40deg, IR/ADD to level of lateral glut   Shoulder PROM: flex 145deg, ext 60deg, abd 138deg, [email or web address] 70deg, [email or web address] 68deg   Shoulder strength, within available ROM: flex 4/5, ext 5/5, abd 4-/5, IR 5/5, ER 4/5   Pain: (A) 2     Pain Level (0-10 scale) post treatment: 0    ASSESSMENT/Changes in Function:  Patient making progress towards goals. Cont'd limitations into ER with capsular end-feel. Patient will continue to benefit from skilled PT services to modify and progress therapeutic interventions, address functional mobility deficits, address ROM deficits, address strength deficits, analyze and address soft tissue restrictions, analyze and cue movement patterns, analyze and modify body mechanics/ergonomics, assess and modify postural abnormalities and instruct in home and community integration to attain remaining goals. []  See Plan of Care  [x]  See progress note/recertification  []  See Discharge Summary         Progress towards goals / Updated goals: All goals reviewed and progressing appropriately as of 8/28/2017  1. Patient to achieve AROM L shoulder flexion of 140 degrees in order to facilitate ability to perform overhead activities and lifting light objects on top shelf  -Goal progressing; 125 deg shoulder flexion AROM with minimal UT substitutions (8/22/17)   2. Patient to achieve AROM L shoulder abduction of 140 degrees in order to facilitate ability to perform washing/brushing of hair unable to initiate until 6 weeks post op 7/23/17    3. Patient to improve FOTO score to > stage 4 to indicate increased functional independence   4.   Pt will have an increase in MMT of the L GHJ and scapular stabilizers to > or = 4/5 all planes to improve lifting, reaching, carrying      PLAN  [x]  Upgrade activities as tolerated     [x]  Continue plan of care  [x]  Update interventions per flow sheet       []  Discharge due to:_  [x]  Other: assess goals for PN TRACEY Rincon, PTA 8/29/2017

## 2017-08-29 ENCOUNTER — HOSPITAL ENCOUNTER (OUTPATIENT)
Dept: PHYSICAL THERAPY | Age: 67
Discharge: HOME | End: 2017-08-29
Payer: MEDICARE

## 2017-08-29 PROCEDURE — 97140 MANUAL THERAPY 1/> REGIONS: CPT

## 2017-08-29 PROCEDURE — 97110 THERAPEUTIC EXERCISES: CPT

## 2017-08-31 ENCOUNTER — HOSPITAL ENCOUNTER (OUTPATIENT)
Dept: PHYSICAL THERAPY | Age: 67
Discharge: HOME | End: 2017-08-31
Payer: MEDICARE

## 2017-08-31 PROCEDURE — G8985 CARRY GOAL STATUS: HCPCS

## 2017-08-31 PROCEDURE — 97140 MANUAL THERAPY 1/> REGIONS: CPT

## 2017-08-31 PROCEDURE — G8984 CARRY CURRENT STATUS: HCPCS

## 2017-08-31 PROCEDURE — 97110 THERAPEUTIC EXERCISES: CPT

## 2017-08-31 NOTE — PROGRESS NOTES
PT DAILY TREATMENT NOTE     Patient Name: Martine Katz  Date:2017  : 1950  [x]  Patient  Verified  Payor: Paulette Ferreira / Plan: [place] MEDICARE PART A [place] B / Product Type: Medicare /    In time: 10:03am       Out time: 11:13am  Total Treatment Time (min): 70  Total Timed Codes (min): 55  1:1 Treatment Time (min): 40  Visit #: 10 of 8-10    Treatment Area: Left shoulder pain [M25.512]    SUBJECTIVE  Pain Level (0-10 scale): 2  Any medication changes, allergies to medications, adverse drug reactions, diagnosis change, or new procedure performed?: [x] No    [] Yes (see summary sheet for update)  Subjective functional status/changes:   [] No changes reported  \"Still that dull ache. I have stenosis. You think it's that? \"    OBJECTIVE  Modality rationale: decrease pain and increase tissue extensibility to improve the patients ability to improve comfort with sleeping, ADLs    Min Type Additional Details    [] Estim: []Att   []Unatt        []TENS instruct                  []IFC  []Premod   []NMES                     []Other:  []w/US   []w/ice   []w/heat  Position:  Location:    []  Traction: [] Cervical       []Lumbar                       [] Prone          []Supine                       []Intermittent   []Continuous Lbs:  [] before manual  [] after manual    []  Ultrasound: []Continuous   [] Pulsed                           []1MHz   []3MHz Location:  W/cm2:    []  Iontophoresis with dexamethasone         Location: [] Take home patch   [] In clinic   15 []  Ice     [x]  heat  []  Ice massage Position: seated  Location: (L) UT    []  Vasopneumatic Device - sitting Pressure:       [] lo [x] med [] hi   Temperature: [x] lo [] med [] hi   [x] Skin assessment post-treatment:  [x]intact []redness- no adverse reaction       []redness  adverse reaction:     32 min Therapeutic Exercise:  [x] See flow sheet:   Rationale: increase ROM and increase strength to improve the patients ability to improve reaching, ADLs, dressing     23 min Manual Therapy:  Reassessment; grade IV (L) GHJ mobs f/b PROM, scap mobs, UT TPR and gentle manual traction   Rationale: decrease pain, increase ROM and increase tissue extensibility to improve prognosis for AROM phase           X min Patient Education: [x] Modified HEP     Other Objective/Functional Measures:    FOTO score: 56/100 (at IE, 42/100)   Shoulder AROM: flex 122deg, ext 40deg, abd 108deg, [email or web address] 40deg, IR/ADD to level of lateral glut   Shoulder PROM: flex 145deg, ext 60deg, abd 138deg, [email or web address] 70deg, [email or web address] 68deg   Shoulder strength, within available ROM: flex 4/5, ext 5/5, abd 4-/5, IR 5/5, ER 4/5   Pain: (A) 2   Capsular tightness noted; pt responding well to aggressive capsular stretching       Pain Level (0-10 scale) post treatment: 0    ASSESSMENT/Changes in Function:  See PN. Patient will continue to benefit from skilled PT services to modify and progress therapeutic interventions, address functional mobility deficits, address ROM deficits, address strength deficits, analyze and address soft tissue restrictions, analyze and cue movement patterns, analyze and modify body mechanics/ergonomics, assess and modify postural abnormalities and instruct in home and community integration to attain remaining goals. []  See Plan of Care  [x]  See progress note/recertification (9/42/17)  []  See Discharge Summary         Progress towards goals / Updated goals: All goals reviewed and progressing appropriately as of 8/31/2017  1. Patient to achieve AROM L shoulder flexion of 140 degrees in order to facilitate ability to perform overhead activities and lifting light objects on top shelf  -Goal progressing; 122 deg shoulder flexion AROM (NT at last assessment)   2. Patient to achieve AROM L shoulder abduction of 140 degrees in order to facilitate ability to perform washing/brushing of hair. -Goal progressing; 108 deg shoulder ABD AROM (NT at last assessment)   3.   Patient to improve FOTO score to > stage 4 to indicate increased functional independence. -Goal progressing; FOTO improved from stage 2 to stage 3   4.   Pt will have an increase in MMT of the L GHJ and scapular stabilizers to > or = 4/5 all planes to improve lifting, reaching, carrying. -Goal progressing; strength as per above     PLAN  [x]  Upgrade activities as tolerated     [x]  Continue plan of care  [x]  Update interventions per flow sheet       []  Discharge due to:_  [x]  Other: see PN; cont for remaining scheduled sessions    Danielle Jones, PTA 8/31/2017

## 2017-08-31 NOTE — PROGRESS NOTES
[address] [facility] [address] [facility] AT [address]  Phone: [phone] Fax: [phone]  PROGRESS NOTE  Patient Name: Lili Pride : 1950   Treatment/Medical Diagnosis: Left shoulder pain [M25.512]   Referral Source: Petar Chavez MD     Date of Initial Visit: 17; DOS 17 Attended Visits: 25 Missed Visits: 0     SUMMARY OF TREATMENT  Patient is a 78 y.o. female s/p L SAD RCR, DOS 17. Treatment has consisted of review of precautions and self-care, don/doffing sling, sleeping positions; Ther ex including distal UE ROM and strengthening, flexibility, scapular stabilization; manual therapy including: PROM GHJ, scapula, TrP release as needed; Patient education; HEP, cryotherapy for edema and pain control. CURRENT STATUS  Patient cont'ing to develop appropriate scapular control for OH reaching. Pain levels average 2/10 on average and 0/10 at best. Patient notes ability to return to mowing grass without exacerbation of symptoms. Capsular tightness beginning to develop and addressing well with aggressive stretching. Objective assessment as follows:  Improvements: washing, bathing, cooking, ADLs, ADLs out of sling, brushing teeth, driving  Deficits: full AROM, 2/10 average pain, capsular tightness  Shoulder AROM: flex 122deg, ext 40deg, abd 108deg, [email or web address] 40deg, IR/ADD to level of lateral glut  Shoulder PROM: flex 145deg, ext 60deg, abd 138deg, [email or web address] 70deg, [email or web address] 68deg  Shoulder strength, within available ROM: flex 4/5, ext 5/5, abd 4-/5, IR 5/5, ER 4/5  Pain: (A) 2      Goals:  1.   Patient to achieve AROM L shoulder flexion of 140 degrees in order to facilitate ability to perform overhead activities and lifting light objects on top shelf  -Goal progressing; 122 deg shoulder flexion AROM (NT at last assessment)   2.   Patient to achieve AROM L shoulder abduction of 140 degrees in order to facilitate ability to perform washing/brushing of hair. -Goal progressing; 108 deg shoulder ABD AROM (NT at last assessment)   3. Patient to improve FOTO score to > stage 4 to indicate increased functional independence. -Goal progressing; FOTO improved from stage 2 to stage 3   4. Pt will have an increase in MMT of the L GHJ and scapular stabilizers to > or = 4/5 all planes to improve lifting, reaching, carrying. -Goal progressing; strength as per above     New Goals to be achieved in __2_  treatments:  Cont per established goals as currently progressing. G-Codes: Carry [id number] Current  CK= 40-59%   [id number] Goal  CJ= 20-39%. The severity rating is based on the FOTO Score     RECOMMENDATIONS  Recommend co'nt with PT interventions for remaining scheduled sessions. Thank you! If you have any questions/comments please contact us directly at [phone]. Thank you for allowing us to assist in the care of your patient. PTA Signature: Camilo Leal, JACKELYN Date: 8/31/2017   PT Signature: Emery Welch DPT  Time: 7:47 AM   NOTE TO PHYSICIAN:  PLEASE COMPLETE THE ORDERS BELOW AND FAX TO   [facility]: [phone]. If you are unable to process this request in 24 hours please contact our office: [phone].  ___ I have read the above report and request that my patient continue as recommended.   ___ I have read the above report and request that my patient continue therapy with the following changes/special instructions:_________________________________________________________   ___ I have read the above report and request that my patient be discharged from therapy.      Physician Signature:        Date:       Time:    Reporting Period: 6/26/17-8/31/17

## 2017-09-05 ENCOUNTER — HOSPITAL ENCOUNTER (OUTPATIENT)
Dept: PHYSICAL THERAPY | Age: 67
Discharge: HOME | End: 2017-09-05
Payer: MEDICARE

## 2017-09-05 PROCEDURE — 97110 THERAPEUTIC EXERCISES: CPT

## 2017-09-05 PROCEDURE — 97140 MANUAL THERAPY 1/> REGIONS: CPT

## 2017-09-05 NOTE — PROGRESS NOTES
PT DAILY TREATMENT NOTE     Patient Name: Martin Oconnell  Date:2017  : 1950  [x]  Patient  Verified  Payor: [place] MEDICARE / Plan: [place] MEDICARE PART A & B / Product Type: Medicare /    In time: 10:00am      Out time: 11:06am  Total Treatment Time (min): 66  Total Timed Codes (min): 51  1:1 Treatment Time (min): 39  Visit #: 2 of 3    Treatment Area: Left shoulder pain [M25.512]    SUBJECTIVE  Pain Level (0-10 scale): 1-2  Any medication changes, allergies to medications, adverse drug reactions, diagnosis change, or new procedure performed?: [x] No    [] Yes (see summary sheet for update)  Subjective functional status/changes:   [] No changes reported  \"I have to say it feels better. I still can't reach out in front or behind my back though. \"    OBJECTIVE  Modality rationale: decrease pain and increase tissue extensibility to improve the patients ability to improve comfort with sleeping, ADLs    Min Type Additional Details    [] Estim: []Att   []Unatt        []TENS instruct                  []IFC  []Premod   []NMES                     []Other:  []w/US   []w/ice   []w/heat  Position:  Location:    []  Traction: [] Cervical       []Lumbar                       [] Prone          []Supine                       []Intermittent   []Continuous Lbs:  [] before manual  [] after manual    []  Ultrasound: []Continuous   [] Pulsed                           []1MHz   []3MHz Location:  W/cm2:    []  Iontophoresis with dexamethasone         Location: [] Take home patch   [] In clinic   15 []  Ice     [x]  heat  []  Ice massage Position: seated  Location: (L) UT    []  Vasopneumatic Device - sitting Pressure:       [] lo [x] med [] hi   Temperature: [x] lo [] med [] hi   [x] Skin assessment post-treatment:  [x]intact []redness- no adverse reaction       []redness  adverse reaction:     31 min Therapeutic Exercise:  [x] See flow sheet: added stair lat stretch, cane extension, and towel IR/ADD stretch   Rationale: increase ROM and increase strength to improve the patients ability to improve reaching, ADLs, dressing     20 min Manual Therapy: grade IV (L) GHJ mobs f/b PROM, scap mobs in S/L, RS 2 x 30\"   Rationale: decrease pain, increase ROM and increase tissue extensibility to improve prognosis for AROM phase           X min Patient Education: [x] Modified HEP     Other Objective/Functional Measures:     Pain Level (0-10 scale) post treatment: 0    ASSESSMENT/Changes in Function:  Capsular restrictions addressing well with manual stretching, however, pt cont to c/o ACJ discomfort with OH reaching. Pt notes ability to lie on back with inc comfort recently, but lacks significant IR/ADD to allow for unhooking brastrap. Patient will continue to benefit from skilled PT services to modify and progress therapeutic interventions, address functional mobility deficits, address ROM deficits, address strength deficits, analyze and address soft tissue restrictions, analyze and cue movement patterns, analyze and modify body mechanics/ergonomics, assess and modify postural abnormalities and instruct in home and community integration to attain remaining goals. []  See Plan of Care  [x]  See progress note/recertification (4/86/00)  []  See Discharge Summary         Progress towards goals / Updated goals: All goals reviewed and progressing appropriately as of 9/5/2017  1. Patient to achieve AROM L shoulder flexion of 140 degrees in order to facilitate ability to perform overhead activities and lifting light objects on top shelf  -Goal progressing; 122 deg shoulder flexion AROM (NT at last assessment)   2. Patient to achieve AROM L shoulder abduction of 140 degrees in order to facilitate ability to perform washing/brushing of hair. -Goal progressing; 108 deg shoulder ABD AROM (NT at last assessment)   3. Patient to improve FOTO score to > stage 4 to indicate increased functional independence.  -Goal progressing; FOTO improved from stage 2 to stage 3   4.   Pt will have an increase in MMT of the L GHJ and scapular stabilizers to > or = 4/5 all planes to improve lifting, reaching, carrying. -Goal progressing; strength as per above     PLAN  [x]  Upgrade activities as tolerated     [x]  Continue plan of care  [x]  Update interventions per flow sheet       []  Discharge due to:_  [x]  Other: cont remaining scheduled sessions and assess for PT cont at a reduced frequency, pt to see MD tomorrow    Shefali Eastman, PTA 9/5/2017

## 2017-09-05 NOTE — PROGRESS NOTES
PT DAILY TREATMENT NOTE     Patient Name: Renetta Chow  Date:2017  : 1950  [x]  Patient  Verified  Payor: [place] MED[place]ARE / Plan: [place] MEDICARE PART A & B / Product Type: Medicare /    In time: 11:00am      Out time: 12:11am  Total Treatment Time (min): 71  Total Timed Codes (min): 61  1:1 Treatment Time (min): 35  Visit #: 3 of 3    Treatment Area: Left shoulder pain [M25.512]    SUBJECTIVE  Pain Level (0-10 scale): 1-2  Any medication changes, allergies to medications, adverse drug reactions, diagnosis change, or new procedure performed?: [x] No    [] Yes (see summary sheet for update)  Subjective functional status/changes:   [] No changes reported  \"I still can't reach that far behind my back. \"    OBJECTIVE  Modality rationale: decrease pain and increase tissue extensibility to improve the patients ability to improve comfort with sleeping, ADLs    Min Type Additional Details    [] Estim: []Att   []Unatt        []TENS instruct                  []IFC  []Premod   []NMES                     []Other:  []w/US   []w/ice   []w/heat  Position:  Location:    []  Traction: [] Cervical       []Lumbar                       [] Prone          []Supine                       []Intermittent   []Continuous Lbs:  [] before manual  [] after manual    []  Ultrasound: []Continuous   [] Pulsed                           []1MHz   []3MHz Location:  W/cm2:    []  Iontophoresis with dexamethasone         Location: [] Take home patch   [] In clinic   10 []  Ice     [x]  heat  []  Ice massage Position: seated  Location: (L) [place]    []  Vasopneumatic Device - sitting Pressure:       [] lo [x] med [] hi   Temperature: [x] lo [] med [] hi   [x] Skin assessment post-treatment:  [x]intact []redness- no adverse reaction       []redness  adverse reaction:     42 min Therapeutic Exercise:  [x] See flow sheet:   Rationale: increase ROM and increase strength to improve the patients ability to improve reaching, ADLs, dressing     19 min Manual Therapy: grade IV (L) GHJ mobs f/b PROM, lat stretch   Rationale: decrease pain, increase ROM and increase tissue extensibility to improve prognosis for AROM phase           X min Patient Education: [x] Review HEP     Other Objective/Functional Measures:     Pain Level (0-10 scale) post treatment: 0    ASSESSMENT/Changes in Function:  Patient req further scapular strengthening to improve SHR for OH reaching. ER/IR mobility improving with addition of capsular stretching. Patient scheduled for f/u on Friday; will inquire about changes to treatment as pt request cont 2x4 to improve strength for OH reach and FIR. Patient will continue to benefit from skilled PT services to modify and progress therapeutic interventions, address functional mobility deficits, address ROM deficits, address strength deficits, analyze and address soft tissue restrictions, analyze and cue movement patterns, analyze and modify body mechanics/ergonomics, assess and modify postural abnormalities and instruct in home and community integration to attain remaining goals. []  See Plan of Care  [x]  See progress note/recertification (7/01/58)  []  See Discharge Summary         Progress towards goals / Updated goals: All goals reviewed and progressing appropriately as of 9/5/2017  1. Patient to achieve AROM L shoulder flexion of 140 degrees in order to facilitate ability to perform overhead activities and lifting light objects on top shelf  -Goal progressing; 122 deg shoulder flexion AROM (NT at last assessment)   2. Patient to achieve AROM L shoulder abduction of 140 degrees in order to facilitate ability to perform washing/brushing of hair. -Goal progressing; 108 deg shoulder ABD AROM (NT at last assessment)   3. Patient to improve FOTO score to > stage 4 to indicate increased functional independence. -Goal progressing; FOTO improved from stage 2 to stage 3   4.   Pt will have an increase in MMT of the L GHJ and scapular stabilizers to > or = 4/5 all planes to improve lifting, reaching, carrying. -Goal progressing; strength as per above     PLAN  [x]  Upgrade activities as tolerated     [x]  Continue plan of care  [x]  Update interventions per flow sheet       []  Discharge due to:_  [x]  Other: assess goals NV for cont    Awais Harris, PTA 9/6/2017

## 2017-09-06 ENCOUNTER — HOSPITAL ENCOUNTER (OUTPATIENT)
Dept: PHYSICAL THERAPY | Age: 67
Discharge: HOME | End: 2017-09-06
Payer: MEDICARE

## 2017-09-06 PROCEDURE — 97140 MANUAL THERAPY 1/> REGIONS: CPT

## 2017-09-06 PROCEDURE — 97110 THERAPEUTIC EXERCISES: CPT

## 2017-09-08 ENCOUNTER — HOSPITAL ENCOUNTER (OUTPATIENT)
Dept: PHYSICAL THERAPY | Age: 67
Discharge: HOME | End: 2017-09-08
Payer: MEDICARE

## 2017-09-08 PROCEDURE — 97140 MANUAL THERAPY 1/> REGIONS: CPT

## 2017-09-08 NOTE — PROGRESS NOTES
PT DAILY TREATMENT NOTE     Patient Name: Casey Atkinson  Date:2017  : 1950  [x]  Patient  Verified  Payor: [place] MEDICARE / Plan: [place] MEDICARE PART A & B / Product Type: Medicare /    In time: 1:31pm      Out time: 2:29pm  Total Treatment Time (min): 58  Total Timed Codes (min): 48  1:1 Treatment Time (min): 40  Visit #: 1 of 8-10    Treatment Area: Left shoulder pain [M25.512]    SUBJECTIVE  Pain Level (0-10 scale): 1  Any medication changes, allergies to medications, adverse drug reactions, diagnosis change, or new procedure performed?: [x] No    [] Yes (see summary sheet for update)  Subjective functional status/changes:   [] No changes reported  \"Tristin JACOBO) thinks I'm developing adhesive capsulitis. \" Patient arriving with new MD orders requesting focus on ROM only.     OBJECTIVE  Modality rationale: decrease pain and increase tissue extensibility to improve the patients ability to improve comfort with sleeping, ADLs    Min Type Additional Details    [] Estim: []Att   []Unatt        []TENS instruct                  []IFC  []Premod   []NMES                     []Other:  []w/US   []w/ice   []w/heat  Position:  Location:    []  Traction: [] Cervical       []Lumbar                       [] Prone          []Supine                       []Intermittent   []Continuous Lbs:  [] before manual  [] after manual    []  Ultrasound: []Continuous   [] Pulsed                           []1MHz   []3MHz Location:  W/cm2:    []  Iontophoresis with dexamethasone         Location: [] Take home patch   [] In clinic   10 []  Ice     [x]  heat  []  Ice massage Position: seated  Location: (L) [place]   PD []  Vasopneumatic Device - sitting Pressure:       [] lo [x] med [] hi   Temperature: [x] lo [] med [] hi   [x] Skin assessment post-treatment:  [x]intact []redness- no adverse reaction       []redness  adverse reaction:     18 min Therapeutic Exercise:  [x] See flow sheet:   Rationale: increase ROM and increase strength to improve the patients ability to improve reaching, ADLs, dressing     30 min Manual Therapy: DTM to lat; scap mobs in S/L; grade IV (L) GHJ mobs f/b and during PROM, resisted ecc ER, IR/ADD stretching in S/L   Rationale: decrease pain, increase ROM and increase tissue extensibility to improve prognosis for AROM phase           X min Patient Education: [x] Review HEP     Other Objective/Functional Measures:    ER AROM: 60 degrees    Pain Level (0-10 scale) post treatment: 0    ASSESSMENT/Changes in Function:  Patient responding appropriately to aggressive cap stretching. Patient will continue to benefit from skilled PT services to modify and progress therapeutic interventions, address functional mobility deficits, address ROM deficits, address strength deficits, analyze and address soft tissue restrictions, analyze and cue movement patterns, analyze and modify body mechanics/ergonomics, assess and modify postural abnormalities and instruct in home and community integration to attain remaining goals. []  See Plan of Care  [x]  See progress note/recertification (2/66/65)  []  See Discharge Summary         Progress towards goals / Updated goals: All goals reviewed and progressing appropriately as of 9/8/2017  1. Patient to achieve AROM L shoulder flexion of 140 degrees in order to facilitate ability to perform overhead activities and lifting light objects on top shelf  -Goal progressing; 122 deg shoulder flexion AROM (NT at last assessment)   2. Patient to achieve AROM L shoulder abduction of 140 degrees in order to facilitate ability to perform washing/brushing of hair. -Goal progressing; 108 deg shoulder ABD AROM (NT at last assessment)   3. Patient to improve FOTO score to > stage 4 to indicate increased functional independence. -Goal progressing; FOTO improved from stage 2 to stage 3   4.   Pt will have an increase in MMT of the L GHJ and scapular stabilizers to > or = 4/5 all planes to improve lifting, reaching, carrying. -Goal progressing; strength as per above     PLAN  [x]  Upgrade activities as tolerated     [x]  Continue plan of care  [x]  Update interventions per flow sheet       []  Discharge due to:_  []  Other:_    Fran Santos, PTA 9/8/2017

## 2017-09-11 ENCOUNTER — HOSPITAL ENCOUNTER (OUTPATIENT)
Dept: PHYSICAL THERAPY | Age: 67
Discharge: HOME | End: 2017-09-11
Payer: MEDICARE

## 2017-09-11 PROCEDURE — 97110 THERAPEUTIC EXERCISES: CPT

## 2017-09-11 PROCEDURE — 97140 MANUAL THERAPY 1/> REGIONS: CPT

## 2017-09-11 NOTE — PROGRESS NOTES
PT DAILY TREATMENT NOTE     Patient Name: Nia Bourgeois  Date:2017  : 1950  [x]  Patient  Verified  Payor: [place] MEDICARE / Plan: [place] MEDICARE PART A & B / Product Type: Medicare /    In time:10:30  Out time:11:38  Total Treatment Time (min): 68  Total Timed Codes (min): 58  1:1 Treatment Time (min): 11:30 to 11:20   Visit #: 2 of 8-10    Treatment Area: Left shoulder pain [M25.512]    SUBJECTIVE  Pain Level (0-10 scale): 1  Any medication changes, allergies to medications, adverse drug reactions, diagnosis change, or new procedure performed?: [x] No    [] Yes (see summary sheet for update)  Subjective functional status/changes:   [] No changes reported  Pt with MD orders for ROM only    \"I get more motion every day. I think the anti-inflammatory really helps. \"    OBJECTIVE  Modality rationale: decrease pain and increase tissue extensibility to improve the patients ability to improve reaching, mobility    Min Type Additional Details    [] Estim: []Att   []Unatt        []TENS instruct                  []IFC  []Premod   []NMES                     []Other:  []w/US   []w/ice   []w/heat  Position:  Location:    []  Traction: [] Cervical       []Lumbar                       [] Prone          []Supine                       []Intermittent   []Continuous Lbs:  [] before manual  [] after manual    []  Ultrasound: []Continuous   [] Pulsed                           []1MHz   []3MHz Location:  W/cm2:    []  Iontophoresis with dexamethasone         Location: [] Take home patch   [] In clinic   10 []  Ice     [x]  heat  []  Ice massage Position: seated  Location: L [place]    []  Vasopneumatic Device Pressure:       [] lo [] med [] hi   Temperature: [] lo [] med [] hi   [x] Skin assessment post-treatment:  [x]intact []redness- no adverse reaction       []redness  adverse reaction:       33 (1 unt) min Therapeutic Exercise:  [x] See flow sheet : added crossover stretch/posterior capsule stretch    Rationale: increase ROM and increase strength to improve the patients ability to improve reaching, dressing,, ADLs     25 min Manual Therapy:  Grade IV posterior, inferior GHJ mobs; PROM flexion, scaption, ER and IR at 45. Extension, SL abduction    Rationale: decrease pain, increase ROM and increase tissue extensibility to improve reaching, self-care           x min Patient Education: [x] Review HEP    [] Progressed/Changed HEP based on: initiate    [] positioning   [] body mechanics   [] transfers   [] heat/ice application        Other Objective/Functional Measures:   Prom L: IR 40  ER at 45: 60  Flex 140  scapt 142  Extension 50      Pain Level (0-10 scale) post treatment: 0    ASSESSMENT/Changes in Function: pt progressing with PROM. Pt with difficulty tolerating joint mobilizations but requires them for full return to function. Pt requires VC and tactile cues of the wall to assist with form and avoid scapular movement with GHJ stretching. Patient will continue to benefit from skilled PT services to modify and progress therapeutic interventions, address functional mobility deficits, address ROM deficits, address strength deficits, analyze and address soft tissue restrictions, analyze and cue movement patterns, analyze and modify body mechanics/ergonomics, assess and modify postural abnormalities and instruct in home and community integration to attain remaining goals. []  See Plan of Care  []  See progress note/recertification  []  See Discharge Summary         Progress towards goals / Updated goals:  1.   Patient to achieve AROM L shoulder flexion of 140 degrees in order to facilitate ability to perform overhead activities and lifting light objects on top shelf  -Goal progressing; 122 deg shoulder flexion AROM (NT at last assessment)   2.   Patient to achieve AROM L shoulder abduction of 140 degrees in order to facilitate ability to perform washing/brushing of hair. -Goal progressing; 108 deg shoulder ABD AROM (NT at last assessment)   3. Patient to improve FOTO score to > stage 4 to indicate increased functional independence. -Goal progressing; FOTO improved from stage 2 to stage 3   4.   Pt will have an increase in MMT of the L GHJ and scapular stabilizers to > or = 4/5 all planes to improve lifting, reaching, carrying. - Goal on hold as MD notes please focus on ROM to prevent ad capsulitis (9/11/17)         PLAN  [x]  Upgrade activities as tolerated     []  Continue plan of care  []  Update interventions per flow sheet       []  Discharge due to:_  [x]  Other:_continue aggressive ROM/joint mobilizations per MD script noting ROM focus only; suspect Ad Capsulitis      Jayden Bishop, PT 9/11/2017  7:42 AM

## 2017-09-26 ENCOUNTER — HOSPITAL ENCOUNTER (OUTPATIENT)
Dept: PHYSICAL THERAPY | Age: 67
Discharge: HOME | End: 2017-09-26
Payer: MEDICARE

## 2017-09-26 PROCEDURE — 97110 THERAPEUTIC EXERCISES: CPT | Performed by: PHYSICAL THERAPIST

## 2017-09-26 NOTE — PROGRESS NOTES
PT DAILY TREATMENT NOTE     Patient Name: Tyra Reyes  Date:2017  : 1950  [x]  Patient  Verified  Payor: [place] MEDICARE / Plan: [place] MEDICARE PART A & B / Product Type: Medicare /    In time: 1104am  Out time: 1155  Total Treatment Time (min): 51  Total Timed Codes (min): 41  1:1 Treatment Time (min): 8223-5690 30min  Visit #: 3 of 8-10    Treatment Area: Left shoulder pain [M25.512]    SUBJECTIVE  Pain Level (0-10 scale): 0  Any medication changes, allergies to medications, adverse drug reactions, diagnosis change, or new procedure performed?: [x] No    [] Yes (see summary sheet for update)  Subjective functional status/changes:   [] No changes reported    Pt with MD orders for ROM only  \" I am able to sleep on L side now, I can reach to get toilet paper. I didn't have any pain on my trip .  I haven't taken pain pills in a week\"    OBJECTIVE  Modality rationale: decrease pain and increase tissue extensibility to improve the patients ability to improve reaching, mobility    Min Type Additional Details    [] Estim: []Att   []Unatt        []TENS instruct                  []IFC  []Premod   []NMES                     []Other:  []w/US   []w/ice   []w/heat  Position:  Location:    []  Traction: [] Cervical       []Lumbar                       [] Prone          []Supine                       []Intermittent   []Continuous Lbs:  [] before manual  [] after manual    []  Ultrasound: []Continuous   [] Pulsed                           []1MHz   []3MHz Location:  W/cm2:    []  Iontophoresis with dexamethasone         Location: [] Take home patch   [] In clinic   10 []  Ice     [x]  heat  []  Ice massage Position: seated  Location: L [place]    []  Vasopneumatic Device Pressure:       [] lo [] med [] hi   Temperature: [] lo [] med [] hi   [x] Skin assessment post-treatment:  [x]intact []redness- no adverse reaction       []redness  adverse reaction:       41/30 min Therapeutic Exercise:  [x] See flow sheet : added crossover stretch/posterior capsule stretch    Rationale: increase ROM and increase strength to improve the patients ability to improve reaching, dressing,, ADLs     HELD min Manual Therapy:  Grade IV posterior, inferior GHJ mobs; PROM flexion, scaption, ER and IR at 45. Extension, SL abduction    Rationale: decrease pain, increase ROM and increase tissue extensibility to improve reaching, self-care           x min Patient Education: [x] Review HEP    [] Progressed/Changed HEP based on: initiate    [] positioning   [] body mechanics   [] transfers   [] heat/ice application        Other Objective/Functional Measures:   DC NV  Added 2# for Sl Er with good tolerance, added YTB D2  And B ER for HEP,  Extension : 50 of PROM, AROM : 45deg   AAROM FIR on L: thumb to L1  Progressed to counter push ups with good form  Assess stregth NV to determine DC     Pain Level (0-10 scale) post treatment: 0    ASSESSMENT/Changes in Function: pt progressing with PROM. Patient states she would like to maybe try to DC on her next visit as she reports no functional deficits with ADLs. She reports being able to lift milk out of fridge with no pain. Gave patient HEP progression for strengthening     Patient will continue to benefit from skilled PT services to modify and progress therapeutic interventions, address functional mobility deficits, address ROM deficits, address strength deficits, analyze and address soft tissue restrictions, analyze and cue movement patterns, analyze and modify body mechanics/ergonomics, assess and modify postural abnormalities and instruct in home and community integration to attain remaining goals. []  See Plan of Care  []  See progress note/recertification  []  See Discharge Summary         Progress towards goals / Updated goals:  1.   Patient to achieve AROM L shoulder flexion of 140 degrees in order to facilitate ability to perform overhead activities and lifting light objects on top shelf  -   2. Patient to achieve AROM L shoulder abduction of 140 degrees in order to facilitate ability to perform washing/brushing of hair. -MET 9-26-17   3. Patient to improve FOTO score to > stage 4 to indicate increased functional independence. -Goal progressing; FOTO improved from stage 2 to stage 3   4.   Pt will have an increase in MMT of the L GHJ and scapular stabilizers to > or = 4/5 all planes to improve lifting, reaching, carrying. - Goal on hold as MD notes please focus on ROM to prevent ad capsulitis (9/11/17)         PLAN  [x]  Upgrade activities as tolerated     []  Continue plan of care  []  Update interventions per flow sheet       []  Discharge due to:_  [x]  Other:_KIEL Morris, PT 9/26/2017  7:42 AM

## 2017-09-29 ENCOUNTER — HOSPITAL ENCOUNTER (OUTPATIENT)
Dept: PHYSICAL THERAPY | Age: 67
Discharge: HOME | End: 2017-09-29
Payer: MEDICARE

## 2017-09-29 PROCEDURE — G8978 MOBILITY CURRENT STATUS: HCPCS

## 2017-09-29 PROCEDURE — G8980 MOBILITY D/C STATUS: HCPCS

## 2017-09-29 PROCEDURE — G8985 CARRY GOAL STATUS: HCPCS

## 2017-09-29 PROCEDURE — G8986 CARRY D/C STATUS: HCPCS

## 2017-09-29 PROCEDURE — 97110 THERAPEUTIC EXERCISES: CPT

## 2017-09-29 NOTE — PROGRESS NOTES
PT DAILY TREATMENT NOTE     Patient Name: Angelito Tobias  Date:2017  : 1950  [x]  Patient  Verified  Payor: [place] MEDICARE / Plan: [place] MEDICARE PART A & B / Product Type: Medicare /    In time: 2:06  Out time: 2:50  Total Treatment Time (min): 44  Total Timed Codes (min): 34  1:1 Treatment Time (min):34  Visit #: 4 of 8-10    Treatment Area: Left shoulder pain [M25.512]    SUBJECTIVE  Pain Level (0-10 scale): 0  Any medication changes, allergies to medications, adverse drug reactions, diagnosis change, or new procedure performed?: [x] No    [] Yes (see summary sheet for update)  Subjective functional status/changes:   [] No changes reported  See DC Summary. OBJECTIVE  Modality rationale: decrease pain and increase tissue extensibility to improve the patients ability to improve reaching, mobility    Min Type Additional Details    [] Estim: []Att   []Unatt        []TENS instruct                  []IFC  []Premod   []NMES                     []Other:  []w/US   []w/ice   []w/heat  Position:  Location:    []  Traction: [] Cervical       []Lumbar                       [] Prone          []Supine                       []Intermittent   []Continuous Lbs:  [] before manual  [] after manual    []  Ultrasound: []Continuous   [] Pulsed                           []1MHz   []3MHz Location:  W/cm2:    []  Iontophoresis with dexamethasone         Location: [] Take home patch   [] In clinic   10 []  Ice     [x]  heat  []  Ice massage Position: seated  Location: L [place]    []  Vasopneumatic Device Pressure:       [] lo [] med [] hi   Temperature: [] lo [] med [] hi   [x] Skin assessment post-treatment:  [x]intact []redness- no adverse reaction       []redness  adverse reaction:     34 min Therapeutic Exercise:  [x] See flow sheet : with re-assessment   Rationale: increase ROM and increase strength to improve the patients ability to improve reaching, dressing,, ADLs         x min Patient Education: Updated HEP for D/C. Written and verbal instruction with good pt understanding. Other Objective/Functional Measures:   See DC Summary. FOTO: 65    Pain Level (0-10 scale) post treatment: 0    ASSESSMENT/Changes in Function: See DC Summary. Patient will continue to benefit from skilled PT services to modify and progress therapeutic interventions, address functional mobility deficits, address ROM deficits, address strength deficits, analyze and address soft tissue restrictions, analyze and cue movement patterns, analyze and modify body mechanics/ergonomics, assess and modify postural abnormalities and instruct in home and community integration to attain remaining goals. []  See Plan of Care  []  See progress note/recertification  [x]  See Discharge Summary         Progress towards goals / Updated goals:  See DC Summary.      PLAN  []  Upgrade activities as tolerated     []  Continue plan of care  []  Update interventions per flow sheet       [x]  Discharge due to:_  []  Other:_    Jessica Tolentino V, PTA 9/29/2017  7:42 AM

## 2017-09-29 NOTE — PROGRESS NOTES
[address] [facility] AT [address]. [facility] [address], [place] [address]     Phone: [phone] Fax: [phone] SUMMARY  Patient Name: Maverick Lowry : 1950   Treatment/Medical Diagnosis: Left shoulder pain [M25.512]   Referral Source: Adalgisa Kohler MD     Date of Initial Visit: 17 Attended Visits: 31 Missed Visits: 0     SUMMARY OF TREATMENT  Patient is a 78 y.o. female s/p L SAD RCR, DOS 17. Treatment has consisted of  ther ex including distal UE ROM and strengthening, flexibility, scapular stabilization; manual therapy including: PROM GHJ, scapula, TrP release as needed; Patient education; HEP, cryotherapy for edema and pain control. CURRENT STATUS  Patient reports approximately 95% overall improvement since initial evaluation with no c/o pain the last 2 weeks in shoulder or neck. Primary c/o with reaching behind the back though gradually improving. Pt denies difficulty with any home ADLs; states she is now able to wash/brush hair/teeth, reach for her seat belt, dress more readily and reach into overhead cabinets. L Shoulder AROM: WFL/WNL. Reach behind back: L:T12 vs R: T10  Shoulder MMT: Flex: 4+/5 Ext: 5/5 Abd:4/5   IR/ER: 5/5    Goal/Measure of Progress Goal Met? 1. Patient to achieve AROM L shoulder flexion of 140 degrees in order to facilitate ability to perform overhead activities and lifting light objects on top shelf   Status at last Eval: 122 deg Current Status: Met yes   2. Patient to achieve AROM L shoulder abduction of 140 degrees in order to facilitate ability to perform washing/brushing of hair   Status at last Eval: 108 deg Current Status: Met yes   3. Patient to improve FOTO score to > stage 4 to indicate increased functional independence. Status at last Eval: 56 Current Status: 65 yes   4.   Pt will have an increase in MMT of the L GHJ and scapular stabilizers to > or = 4/5 all planes to improve lifting, reaching, carrying. Status at last Eval: Flex: 4/5 Ext: 5/5  Abd:4-/5 IR/ER: 5/5 Current Status: Flex: 4+/5 Ext: 5/5  Abd:4/5 IR/ER: 5/5 yes   Mobility:  Current  CJ= 20-39%  D/C  CJ= 20-39%. The severity rating is based on the FOTO Score  RECOMMENDATIONS  Discontinue therapy. Progressing towards or have reached established goals. If you have any questions/comments please contact us directly at [phone]. Thank you for allowing us to assist in the care of your patient.   LPTA Signature: Alexandro Wong Date: 9/29/17   Therapist Signature: Jaquelin Varma DPT  Time: 11:37 AM   Reporting Period:  6/26/17-9/29/17

## 2017-10-03 ENCOUNTER — APPOINTMENT (OUTPATIENT)
Dept: PHYSICAL THERAPY | Age: 67
End: 2017-10-03

## 2017-10-06 ENCOUNTER — APPOINTMENT (OUTPATIENT)
Dept: PHYSICAL THERAPY | Age: 67
End: 2017-10-06

## 2017-10-10 ENCOUNTER — APPOINTMENT (OUTPATIENT)
Dept: PHYSICAL THERAPY | Age: 67
End: 2017-10-10

## 2017-10-13 ENCOUNTER — APPOINTMENT (OUTPATIENT)
Dept: PHYSICAL THERAPY | Age: 67
End: 2017-10-13

## 2017-10-17 ENCOUNTER — APPOINTMENT (OUTPATIENT)
Dept: PHYSICAL THERAPY | Age: 67
End: 2017-10-17

## 2017-10-20 ENCOUNTER — APPOINTMENT (OUTPATIENT)
Dept: PHYSICAL THERAPY | Age: 67
End: 2017-10-20

## 2017-12-18 ENCOUNTER — IMPORTED ENCOUNTER (OUTPATIENT)
Dept: URBAN - METROPOLITAN AREA CLINIC 1 | Facility: CLINIC | Age: 67
End: 2017-12-18

## 2017-12-18 PROBLEM — E11.9: Noted: 2017-12-18

## 2017-12-18 PROBLEM — H35.372: Noted: 2017-12-18

## 2017-12-18 PROBLEM — H04.123: Noted: 2017-12-18

## 2017-12-18 PROBLEM — Z79.84: Noted: 2017-12-18

## 2017-12-18 PROBLEM — H16.143: Noted: 2017-12-18

## 2017-12-18 PROBLEM — Z96.1: Noted: 2017-12-18

## 2017-12-18 PROBLEM — H43.813: Noted: 2017-12-18

## 2017-12-18 PROBLEM — H26.493: Noted: 2017-12-18

## 2017-12-18 PROBLEM — H40.1232: Noted: 2017-12-18

## 2017-12-18 PROCEDURE — 92014 COMPRE OPH EXAM EST PT 1/>: CPT

## 2017-12-18 PROCEDURE — 92133 CPTRZD OPH DX IMG PST SGM ON: CPT

## 2017-12-18 NOTE — PATIENT DISCUSSION
1.  DM Type II without sign of diabetic retinopathy and no blot heme on dilated retinal examination today OU No Macular Edema: Stable. Discussed the pathophysiology of diabetes and its effect on the eye and risk of blindness. Stressed the importance of strong glucose control. Advised of importance of at least yearly dilated examinations but to contact us immediately for any problems or concerns. 2. Type II diabetes controlled by oral medications. 3.  PCO OU: Observe and consider yag cap when pt feels pco visually significant and visual acuity decreases to appropriate level. 4. MARIAM w/ PEK OU- Stable. Continue Restasis OU BID. The continuation of artificial tears were recommended. Cauterized LL's OU. 5.  PVD OU- Old stable. 6.  Pseudophakia OU- Doing well. 7.  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 Minimal thinning by OCT OU no progression. Patient to continue with Latanoprost OU QHS. Condition was discussed with patient and patient understands. Will continue to monitor patient for any progression in condition. Patient was advised to call us with any problems questions or concerns. 8.  Epiretinal Membrane OS- Progressed. Observe for change. 9. Return for an appointment for a 10/[facility] in 6 months with Dr. Kylee Baker.

## 2018-06-19 ENCOUNTER — IMPORTED ENCOUNTER (OUTPATIENT)
Dept: URBAN - METROPOLITAN AREA CLINIC 1 | Facility: CLINIC | Age: 68
End: 2018-06-19

## 2018-06-19 PROBLEM — Z96.1: Noted: 2018-06-19

## 2018-06-19 PROBLEM — H26.493: Noted: 2018-06-19

## 2018-06-19 PROBLEM — H04.123: Noted: 2018-06-19

## 2018-06-19 PROBLEM — H16.143: Noted: 2018-06-19

## 2018-06-19 PROBLEM — H40.1232: Noted: 2018-06-19

## 2018-06-19 PROCEDURE — 92012 INTRM OPH EXAM EST PATIENT: CPT

## 2018-06-19 PROCEDURE — 92083 EXTENDED VISUAL FIELD XM: CPT

## 2018-06-19 NOTE — PATIENT DISCUSSION
MARIAM w/ PEK OU- (cauterized OU) The increase of artificial tears OU TID and Restasis OU BID Routinely were recommended.

## 2018-06-19 NOTE — PATIENT DISCUSSION
1.  Moderate Low tension glaucoma OU -(.4 OD/ .55 OS) IOP stable and HVF essentially nml OU -Patient to continue with current gtt regimen. (Latanoprost OU QHS) Condition was discussed with patient and patient understands. Will continue to monitor patient for any progression in condition. Patient was advised to call us with any problems questions or concerns. 2.  MARIAM w/ PEK OU- (cauterized OU) The increase of artificial tears OU TID and Restasis OU BID Routinely were recommended. 3.  Pseudophakia OU - standard4. PCO OU: (Posterior Capsule Opacification)   Observe and consider yag cap when pt feels pco visually significant and visual acuity decreases to appropriate level. 5. Return for an appointment for 6mo/30 OCT with Dr. Meño Hill.

## 2019-01-03 ENCOUNTER — IMPORTED ENCOUNTER (OUTPATIENT)
Dept: URBAN - METROPOLITAN AREA CLINIC 1 | Facility: CLINIC | Age: 69
End: 2019-01-03

## 2019-01-03 PROBLEM — H35.372: Noted: 2019-01-03

## 2019-01-03 PROBLEM — H26.493: Noted: 2019-01-03

## 2019-01-03 PROBLEM — Z96.1: Noted: 2019-01-03

## 2019-01-03 PROBLEM — Z79.84: Noted: 2019-01-03

## 2019-01-03 PROBLEM — H40.1132: Noted: 2019-01-03

## 2019-01-03 PROBLEM — H04.123: Noted: 2019-01-03

## 2019-01-03 PROBLEM — E11.9: Noted: 2019-01-03

## 2019-01-03 PROBLEM — H16.143: Noted: 2019-01-03

## 2019-01-03 PROBLEM — H43.813: Noted: 2019-01-03

## 2019-01-03 PROCEDURE — 92014 COMPRE OPH EXAM EST PT 1/>: CPT

## 2019-01-03 PROCEDURE — 92133 CPTRZD OPH DX IMG PST SGM ON: CPT

## 2019-01-03 NOTE — PATIENT DISCUSSION
1.  DM Type II without sign of diabetic retinopathy and no blot heme on dilated retinal examination today OU No Macular Edema: Stable. Discussed the pathophysiology of diabetes and its effect on the eye and risk of blindness. Stressed the importance of strong glucose control. Advised of importance of at least yearly dilated examinations but to contact us immediately for any problems or concerns. 2. Type II diabetes controlled by oral medications. 3.  Moderate Open Angle Glaucoma OU (0.4/0.55)- Stable IOP and C/D OU. Slight progression by OCT OS. No significant progression by OCT OD. Patient to continue with Lataoprost OU QHS. Patient advised to be compliant with gtts. Condition was discussed with patient and patient understands. Will continue to monitor patient for any progression in condition. Patient was advised to call us with any problems questions or concerns. 4.  PCO OU: Observe and consider yag cap when pt feels pco visually significant and visual acuity decreases to appropriate level. 5. MARIAM OU w/ PEK resolved OU- Much improved. Continue Restasis OU BID. Cauterized LLs OU. The continuation of artificial tears were recommended. 6.  PVD OU- Old stable. RD precautions. 7.  Epiretinal Membrane OS- Appears benign. Observe for change. 8. Pseudophakia OU- Doing well. 9. Return for an appointment for 10/HVf/glare in 6 months with Dr. He Morillo.

## 2019-01-29 ENCOUNTER — HOSPITAL ENCOUNTER (OUTPATIENT)
Dept: NUTRITION | Age: 69
Discharge: HOME | End: 2019-01-29
Payer: MEDICARE

## 2019-01-29 PROCEDURE — 97802 MEDICAL NUTRITION INDIV IN: CPT

## 2019-01-29 NOTE — PROGRESS NOTES
[address] [place] [address] [address] [facility] 
[phone] Nutrition Assessment  Medical Nutrition Therapy Outpatient Initial Evaluation Patient Name: Jossy Arambula : 1950 Treatment Diagnosis: DM2 Referral Source: Ej Lion MD Start of Care [facility]): 2019 Gender: female Age: 80 y.o. Ht: 63 in Wt:  148 lb  kg BMI: 26.2 BMR Male  [facility] Female 1200 Anthropometrics Assessment: BMI indicates overweight Past Medical History includes: Multiple surgeries (rotator cuffs and ruptured discs, L ankle) Pertinent Medications:  
Metformin, Pioglitazone (2-3 years), fish oil B12, Ca Biochemical Data:  
10/2018 A1C: 6.8 Subjective/Assessment: 
 Pt in today with desire to manage blood sugar. She is a retired RN (since Aug 2016). Pt lives with her \"animal kids\". She states that she cooks but not like she used to because it is not enjoyable. She does eat at home almost every night. She often buys pre-packaged meals from [address] [place] [address] for dinner. Pt exercises by walking dogs every day, and she is now a member of the [employer] and has been attending [employer]. She is meeting with a  to determine exercises fit for her. Current Eating Patterns: Pt drinks water and iced tea - unsweetened. She has 2-3 cups of coffee in the am - black. She'll have Ginger-martha occasionally. No sweetened drinks. Pt doesn't snack much, and doesn't usually have dessert. At times, she will have some peanut M&M's. She does not like dairy - milk, cheese, lorenzo (except pizza and spicy jack cheese) B: cereal or oatmeal or cinnamon raisin toast or frozen waffles with SF syrup. At times she will have an egg. L: skips or frozen meal or lunch meat on sandwich or soup or dollar meal [employer] chicken meal with fries and tea D: frozen meal or fish in toaster oven with potato latkes (or other starch) with veggies Estimate Needs Calories:  1400 Protein: 105 Carbs: 123 Fat: 54 Kcal/day  g/day  g/day  g/day   
    percent: 30  35  35 Education & Recommendations provided: Discussed the Healthy Plate Method and appropriate portion sizes of different food groups. Explained the importance of combining carbohydrates with protein at meals and reviewed foods that contain each nutrient. Emphasized the importance of consistent meal time intervals throughout the day to improve metabolism. Explained calorie density and empty calories to assist pt with understanding portion sizes and limiting excess calories. Educated pt on all carbohydrates found in foods and encouraged no more than 30-40 g/meal and 15-20 g/snack. Encouraged pt not to go longer than 5 hours without eating but to space meals/snacks at least 3 hours apart. Encouraged pt to stop eating after the last meal of the day. Handouts Provided: [x]  Carbohydrates [x]  Protein []  Fiber 
[]  Serving Sizes [x]  Meal and Snack Ideas 
[]  Food Journals [x]  Diabetes []  Cholesterol 
[]  Sodium 
[]  Gen Nutr Guidelines 
[]  SBGM Guidelines 
[x]  Others: snacks, non-starchy veggies, rough meal plan, chicken salad recipe Information Reviewed with: Pt Readiness to Change Stage: []  Pre-contemplative    []  Contemplative 
[]  Preparation               []  Action                  []  Maintenance Potential Barriers to Learning: []  Decline in memory    []  Language barrier   []  Other: 
[]  Emotional                  []  Limited mobility 
[]  Lack of motivation     [] Vision, hearing or cognitive impairment Expected Compliance: Good due to pt having good understanding of nutrition ed. Nutritional Goal - To promote lifestyle changes to result in:   
[x]  Weight loss [x]  Improved diabetic control 
[]  Decreased cholesterol levels 
[]  Decreased blood pressure 
[]  Weight maintenance []  Preventing any interactions associated with food allergies []  Adequate weight gain toward goal weight 
[]  Other:  
  
 
Patient Goals: SMART goals 1. Limit CHO's to 30-40 g/meal and 15-20 g/snack 2. Have at least 1 serving protein every time you eat 3. Have meal or snack within 1-2 hours of waking, then every 3-5 hours - stop 2 hours before bed 4. Look into [employer] frozen meals to help control Na, CHO's, and preservatives Dietitian Signature: Raghavendra Dhillon MS, RD Date: 1/29/2019 Follow-up: 3/28/19 @ 1 p Time: 3:39 PM

## 2019-03-28 ENCOUNTER — HOSPITAL ENCOUNTER (OUTPATIENT)
Dept: NUTRITION | Age: 69
Discharge: HOME | End: 2019-03-28
Payer: MEDICARE

## 2019-03-28 PROCEDURE — 97803 MED NUTRITION INDIV SUBSEQ: CPT

## 2019-03-28 NOTE — PROGRESS NOTES
NUTRITION  FOLLOW-UP TREATMENT NOTE Patient Name: Rene Mccall         Date: 3/28/2019 : 1950    YES/NO Patient  Verified Diagnosis: DM2 In time:  1:00            Out time:  1:30 Total Treatment Time (min):  30 min [address] Changes in medication or medical history? Any new allergies, surgeries or procedures? NO    If yes, update Summary List  
Pt in today for follow up. She has been doing well with her goals and has increased her intake of fruit. Pt had 2 cortisone shots in each hip and her A1C increased to 7.0.  
B: cantaloupe, strawberries, beef sticks (3). L: fajitas. D: salmon, scallops, chicken, or shrimp with veggies (broccoli or zucchini/squash). Pt declined to weigh today. She is interested in losing a few lbs. Current Wt: - Previous Wt: 148 Wt Change: - Achievement of Goals: 1. Limit CHO's to 30-40 g/meal and 15-20 g/snack = met, continue 2. Have at least 1 serving protein every time you eat = met, continue 3. Have meal or snack within 1-2 hours of waking, then every 3-5 hours - stop 2 hours before bed = met, continue 4. Look into [employer] frozen meals to help control Na, CHO's, and preservatives = met, d/c Patient Education:  [x]  Review current plan with patient [x]  Other: Because pt's diet seems very balanced, encouraged pt to increase her exercise days or times, or to try different exercises to move the scale. Handouts/ Information Provided: []  Carbohydrates 
[]  Protein []  Fiber 
[]  Serving Sizes []  Fluids 
[]  General guidelines []  Diabetes []  Cholesterol 
[]  Sodium []  SBGM []  Food Journals 
[]  Others:  
New Patient Goals: 1. Check into Y about someone showing you proper wt lifting techniques (if you start, start with weight you can lift 12 times (no more) then increase weight as that gets easier *Pt also encouraged to add an extra day of [place], bike riding, or cardio elsewhere PLAN 
 
 [x]  Continue on current plan []  Follow-up PRN  
[]  Discharge due to :   
[x]  Next appt: Pt will call Dietitian: Iivs Dugan MS, RD Date: 3/28/2019 Time: 12:58 PM

## 2019-07-11 ENCOUNTER — IMPORTED ENCOUNTER (OUTPATIENT)
Dept: URBAN - METROPOLITAN AREA CLINIC 1 | Facility: CLINIC | Age: 69
End: 2019-07-11

## 2019-07-11 PROBLEM — Z96.1: Noted: 2019-07-11

## 2019-07-11 PROBLEM — H04.123: Noted: 2019-07-11

## 2019-07-11 PROBLEM — H16.143: Noted: 2019-07-11

## 2019-07-11 PROBLEM — H40.1232: Noted: 2019-07-11

## 2019-07-11 PROBLEM — H26.493: Noted: 2019-07-11

## 2019-07-11 PROCEDURE — 99213 OFFICE O/P EST LOW 20 MIN: CPT

## 2019-07-11 PROCEDURE — 92083 EXTENDED VISUAL FIELD XM: CPT

## 2019-07-11 NOTE — PATIENT DISCUSSION
1.  Moderate Low Tension Glaucoma OU (CD 0.40/0.55)- HVF WNL OU. IOP stable cont Latanoprost QHS OU. Condition was discussed with patient and patient understands. Will continue to monitor patient for any progression in condition. Patient was advised to call us with any problems questions or concerns. 2.  Dry Eyes OU - Cont Restasis BID OU. Recommend ATs BID OU routinely 3. PCO OU: (Posterior Capsule Opacification)   Observe and consider yag cap when pt feels pco visually significant and visual acuity decreases to appropriate level. 4. Pseudophakia OU - (Standard OU) 5. H/o DM w/o DR OU 6. H/o ERM OS 7. H/o PVD OU Patient defers the refraction at today's visitReturn for an appointment in 6 months 30/OCT/glare with Dr. Kylee Baker.

## 2019-07-12 ENCOUNTER — APPOINTMENT (OUTPATIENT)
Dept: PHYSICAL THERAPY | Age: 69
End: 2019-07-12

## 2020-02-03 ENCOUNTER — IMPORTED ENCOUNTER (OUTPATIENT)
Dept: URBAN - METROPOLITAN AREA CLINIC 1 | Facility: CLINIC | Age: 70
End: 2020-02-03

## 2020-02-03 PROBLEM — E11.9: Noted: 2020-02-03

## 2020-02-03 PROBLEM — H26.491: Noted: 2020-02-03

## 2020-02-03 PROBLEM — Z79.84: Noted: 2020-02-03

## 2020-02-03 PROBLEM — H40.1232: Noted: 2020-02-03

## 2020-02-03 PROCEDURE — 92015 DETERMINE REFRACTIVE STATE: CPT

## 2020-02-03 PROCEDURE — 92133 CPTRZD OPH DX IMG PST SGM ON: CPT

## 2020-02-03 PROCEDURE — 92014 COMPRE OPH EXAM EST PT 1/>: CPT

## 2020-02-03 NOTE — PATIENT DISCUSSION
1.  DM Type II (Oral Meds) without sign of diabetic retinopathy and no blot heme on dilated retinal examination today OU No Macular Edema:  Discussed the pathophysiology of diabetes and its effect on the eye and risk of blindness. Stressed the importance of strong glucose control. Advised of importance of at least yearly dilated examinations but to contact us immediately for any problems or concerns. 2. Moderate Low Tension Glaucoma OU (CD 0.40/0.55) IOP Stable OU on Latanoprost. OCT shows no progression OU. Cont Latanoprost QHS OU. Compliance urged. 3.  PCO OD- Visually significant secondary to glare; Schedule YAG Cap OD only. Pros cons risks and benefits discussed. 4.  PCO OS- observe. 5.  Pseudophakia OU - (Standard OU) 6. ERM OS - stable observe. 7.  PVD OU - stable RD precautions. Letter to PCP Schedule YAG Cap OD only.

## 2020-03-20 ENCOUNTER — IMPORTED ENCOUNTER (OUTPATIENT)
Dept: URBAN - METROPOLITAN AREA CLINIC 1 | Facility: CLINIC | Age: 70
End: 2020-03-20

## 2020-03-20 PROBLEM — H26.491: Noted: 2020-03-20

## 2020-03-20 PROCEDURE — 66821 AFTER CATARACT LASER SURGERY: CPT

## 2020-03-20 NOTE — PATIENT DISCUSSION
YAG CAP OD: (Consent signed and scanned into attachments) 1 gtt Prolensa applied. The purpose and nature of the procedure possible alternative methods of treatment the risks involved and the possibility of complications were discussed with patient. The Patient wishes to proceed and the consent was signed. The laser was then performed under topical anesthesia with no complications. Post op instructions were given to patient as well as a follow-up appointment. Patient was advised to call our office if any questions or concerns.

## 2020-03-26 NOTE — PATIENT DISCUSSION
Continue eye medications per post op instruction sheet.  May have mild steroid response at this point almost done with PA will re-check IOP in a few weeks to make sure is controlled.

## 2020-03-30 ENCOUNTER — IMPORTED ENCOUNTER (OUTPATIENT)
Dept: URBAN - METROPOLITAN AREA CLINIC 1 | Facility: CLINIC | Age: 70
End: 2020-03-30

## 2020-03-30 PROCEDURE — 99024 POSTOP FOLLOW-UP VISIT: CPT

## 2020-03-30 NOTE — PATIENT DISCUSSION
PO YAG Cap OU: good result. MRX given. Return for an appointment in 6 months for 10/HVF with Dr. Kennedi Quintanilla.

## 2020-09-29 ENCOUNTER — IMPORTED ENCOUNTER (OUTPATIENT)
Dept: URBAN - METROPOLITAN AREA CLINIC 1 | Facility: CLINIC | Age: 70
End: 2020-09-29

## 2020-09-29 PROBLEM — H40.1232: Noted: 2020-09-29

## 2020-09-29 PROBLEM — H26.492: Noted: 2020-09-29

## 2020-09-29 PROCEDURE — 92012 INTRM OPH EXAM EST PATIENT: CPT

## 2020-09-29 PROCEDURE — 92083 EXTENDED VISUAL FIELD XM: CPT

## 2020-09-29 NOTE — PATIENT DISCUSSION
1.  Moderate Low Tension Glaucoma OU (CD 0.40/0.55) IOP Stable OU on Latanoprost. HVF today Non specific defects OU. Cont Latanoprost QHS OU. Compliance urged. 2.  PCO OS: (Posterior Capsule Opacification)   Observe and consider yag cap when pt feels pco visually significant and visual acuity decreases to appropriate level. 3. Dry Eyes OU - Cont Restasis BID OU. Recommend ATs BID OU routinely 4. Pseudophakia OU - (Standard OU) H/o YAG OD Only** 5.  H/o DM Type II (Oral Meds) w/o  6.  H/o ERM OS  7. H/o PVD OUReturn for an appointment in 6 months for a 30/OCT/glare OS with Dr. Indu Miller.

## 2021-03-29 ENCOUNTER — IMPORTED ENCOUNTER (OUTPATIENT)
Dept: URBAN - METROPOLITAN AREA CLINIC 1 | Facility: CLINIC | Age: 71
End: 2021-03-29

## 2021-03-29 PROBLEM — H43.813: Noted: 2021-03-29

## 2021-03-29 PROBLEM — Z79.84: Noted: 2021-03-29

## 2021-03-29 PROBLEM — H40.1232: Noted: 2021-03-29

## 2021-03-29 PROBLEM — H35.372: Noted: 2021-03-29

## 2021-03-29 PROBLEM — Z96.1: Noted: 2021-03-29

## 2021-03-29 PROBLEM — E11.9: Noted: 2021-03-29

## 2021-03-29 PROBLEM — H26.492: Noted: 2021-03-29

## 2021-03-29 PROBLEM — H04.123: Noted: 2021-03-29

## 2021-03-29 PROCEDURE — 92133 CPTRZD OPH DX IMG PST SGM ON: CPT

## 2021-03-29 PROCEDURE — 99214 OFFICE O/P EST MOD 30 MIN: CPT

## 2021-03-29 NOTE — PATIENT DISCUSSION
1.  DM Type II (Oral Medication) without sign of diabetic retinopathy and no blot heme on dilated retinal examination today OU No Macular Edema:  Discussed the pathophysiology of diabetes and its effect on the eye and risk of blindness. Stressed the importance of strong glucose control. Advised of importance of at least yearly dilated examinations but to contact us immediately for any problems or concerns. 2. Moderate Low Tension Glaucoma OU (CD 0.40/0.55) - No progression by OCT. IOP stable continue Latanoprost QHS OU. Condition was discussed with patient and patient understands. Will continue to monitor patient for any progression in condition. Patient was advised to call us with any problems questions or concerns. 3.  Dry Eyes OU - (LL cauterized OU) Continue Restasis BID OU (erx). Recommend ATs QID OU routinely4. PCO  OS: (Posterior Capsule Opacification)   Observe and consider yag cap when pt feels pco visually significant and visual acuity decreases to appropriate level. 5. Pseudophakia OU - (Standard OU) H/o YAG Cap OD Only  6. Epiretinal Membrane OS - Observe for change. 7. PVD OU - RD precautions. Return for an appointment in 6 months 10/HVF with Dr. Frandy Pillai.

## 2021-05-19 NOTE — PATIENT DISCUSSION
currently receiving injections with Dr. Laverne Arshad in [address] [place] [address].

## 2021-10-28 ENCOUNTER — IMPORTED ENCOUNTER (OUTPATIENT)
Dept: URBAN - METROPOLITAN AREA CLINIC 1 | Facility: CLINIC | Age: 71
End: 2021-10-28

## 2021-10-28 PROBLEM — H40.1232: Noted: 2021-10-28

## 2021-10-28 PROBLEM — H40.1132: Noted: 2021-10-28

## 2021-10-28 PROCEDURE — 92083 EXTENDED VISUAL FIELD XM: CPT

## 2021-10-28 PROCEDURE — 99213 OFFICE O/P EST LOW 20 MIN: CPT

## 2021-10-28 NOTE — PATIENT DISCUSSION
(CD 0.40/0.55) IOP Stable OU on Latanoprost. HVF today Non specific defects OU. Cont Latanoprost QHS OU. Compliance urged. 2.  PCO OS: (Posterior Capsule Opacification)   Observe and consider yag cap when pt feels pco visually significant and visual acuity decreases to appropriate level. 3. Dry Eyes OU - Cont Restasis BID OU. Recommend ATs BID OU routinely 4. Pseudophakia OU - (Standard OU) H/o YAG OD Only** 5.  H/o DM Type II (Oral Meds) w/o DRJose 6.  H/o ERM OS  7. H/o PVD OUReturn for an appointment in 6 months for a 30/OCT/glare OS with Dr. Dolores Mayes.

## 2021-10-28 NOTE — PATIENT DISCUSSION
1.  Moderate Low Tension Glaucoma OU (CD 0.40/0.55) IOP Stable OU on Latanoprost. HVF WNL OU. Cont Latanoprost QHS OU. Compliance urged. 2.  PCO OS: (Posterior Capsule Opacification)   Observe and consider yag cap when pt feels pco visually significant and visual acuity decreases to appropriate level. 3. Dry Eyes OU - Cont Restasis BID OU. Recommend ATs BID OU routinely 4. Pseudophakia OU - (Standard OU) H/o YAG OD Only** 5.  H/o DM Type II (Oral Meds) w/o  6.  H/o ERM OS  7. H/o PVD OUReturn for an appointment in 6 months for a 30/OCT/glare OS with Dr. Kelsea Phillips.

## 2022-04-02 ASSESSMENT — TONOMETRY
OS_IOP_MMHG: 14
OS_IOP_MMHG: 14
OS_IOP_MMHG: 15
OD_IOP_MMHG: 13
OS_IOP_MMHG: 17
OD_IOP_MMHG: 15
OS_IOP_MMHG: 14
OD_IOP_MMHG: 14
OS_IOP_MMHG: 14
OD_IOP_MMHG: 14
OS_IOP_MMHG: 14
OD_IOP_MMHG: 12
OD_IOP_MMHG: 15
OD_IOP_MMHG: 13
OS_IOP_MMHG: 14
OD_IOP_MMHG: 17
OD_IOP_MMHG: 14
OS_IOP_MMHG: 15

## 2022-04-02 ASSESSMENT — VISUAL ACUITY
OD_SC: 20/25
OS_GLARE: 20/400
OS_SC: 20/20
OD_CC: 20/150
OD_SC: 20/25-2
OS_CC: 20/60
OD_GLARE: 20/50
OS_GLARE: 20/50
OD_SC: 20/30-2
OD_SC: 20/40
OS_SC: 20/20
OD_SC: 20/30
OS_SC: 20/20
OD_SC: 20/30
OS_SC: 20/25
OD_SC: 20/30-1
OD_SC: 20/25
OD_SC: J1+
OS_SC: 20/30
OS_SC: 20/20
OD_SC: 20/25
OS_SC: 20/20
OD_GLARE: 20/50
OS_GLARE: 20/40
OD_GLARE: 20/400
OS_SC: 20/20
OS_SC: 20/20

## 2022-04-28 ENCOUNTER — COMPREHENSIVE EXAM (OUTPATIENT)
Dept: URBAN - METROPOLITAN AREA CLINIC 1 | Facility: CLINIC | Age: 72
End: 2022-04-28

## 2022-04-28 DIAGNOSIS — H26.492: ICD-10-CM

## 2022-04-28 DIAGNOSIS — H35.372: ICD-10-CM

## 2022-04-28 DIAGNOSIS — H40.1232: ICD-10-CM

## 2022-04-28 DIAGNOSIS — E11.9: ICD-10-CM

## 2022-04-28 DIAGNOSIS — H04.123: ICD-10-CM

## 2022-04-28 PROCEDURE — 92133 CPTRZD OPH DX IMG PST SGM ON: CPT

## 2022-04-28 PROCEDURE — 99214 OFFICE O/P EST MOD 30 MIN: CPT

## 2022-04-28 ASSESSMENT — TONOMETRY
OD_IOP_MMHG: 14
OS_IOP_MMHG: 14

## 2022-04-28 NOTE — PATIENT DISCUSSION
Recommend ATs TID OU, routinely. Continue Restasis BID OU (will consider switching to generic due to cost).

## 2022-04-28 NOTE — PATIENT DISCUSSION
(CD 0.40/0.55) - IOP Stable 14 OU on Latanoprost. OCT w/o progression OU. Cont Latanoprost QHS OU. Compliance urged.

## 2022-11-03 ENCOUNTER — FOLLOW UP (OUTPATIENT)
Dept: URBAN - METROPOLITAN AREA CLINIC 1 | Facility: CLINIC | Age: 72
End: 2022-11-03

## 2022-11-03 DIAGNOSIS — E11.9: ICD-10-CM

## 2022-11-03 DIAGNOSIS — H40.1232: ICD-10-CM

## 2022-11-03 PROCEDURE — 92083 EXTENDED VISUAL FIELD XM: CPT

## 2022-11-03 PROCEDURE — 99213 OFFICE O/P EST LOW 20 MIN: CPT

## 2022-11-03 ASSESSMENT — KERATOMETRY
OS_K1POWER_DIOPTERS: 44.00
OS_K2POWER_DIOPTERS: 44.00
OD_K2POWER_DIOPTERS: 44.75
OS_AXISANGLE2_DEGREES: 90
OD_AXISANGLE_DEGREES: 123
OS_AXISANGLE_DEGREES: 180
OD_AXISANGLE2_DEGREES: 33
OD_K1POWER_DIOPTERS: 44.25

## 2022-11-03 ASSESSMENT — VISUAL ACUITY
OD_CC: 20/25
OS_BAT: 20/50
OS_CC: 20/25

## 2022-11-03 ASSESSMENT — TONOMETRY
OS_IOP_MMHG: 13
OD_IOP_MMHG: 14

## 2022-11-03 NOTE — PATIENT DISCUSSION
(CD 0.40/0.55) HVF shows possible early defect OD, WNL OS. IOP stable, continue Latanoprost QHS OU. Condition was discussed with patient and patient understands. Will continue to monitor patient for any progression in condition. Patient was advised to call us with any problems, questions, or concerns.

## 2023-08-14 ENCOUNTER — COMPREHENSIVE EXAM (OUTPATIENT)
Dept: URBAN - METROPOLITAN AREA CLINIC 1 | Facility: CLINIC | Age: 73
End: 2023-08-14

## 2023-08-14 DIAGNOSIS — H04.123: ICD-10-CM

## 2023-08-14 DIAGNOSIS — H40.1232: ICD-10-CM

## 2023-08-14 DIAGNOSIS — E11.9: ICD-10-CM

## 2023-08-14 DIAGNOSIS — H26.492: ICD-10-CM

## 2023-08-14 PROCEDURE — 92133 CPTRZD OPH DX IMG PST SGM ON: CPT

## 2023-08-14 PROCEDURE — 99214 OFFICE O/P EST MOD 30 MIN: CPT

## 2023-08-14 ASSESSMENT — VISUAL ACUITY
OS_CC: 20/25
OD_CC: 20/25

## 2023-08-14 ASSESSMENT — TONOMETRY
OS_IOP_MMHG: 13
OD_IOP_MMHG: 13

## 2024-01-23 ENCOUNTER — FOLLOW UP (OUTPATIENT)
Dept: URBAN - METROPOLITAN AREA CLINIC 1 | Facility: CLINIC | Age: 74
End: 2024-01-23

## 2024-01-23 DIAGNOSIS — H04.123: ICD-10-CM

## 2024-01-23 DIAGNOSIS — H40.1232: ICD-10-CM

## 2024-01-23 DIAGNOSIS — H26.492: ICD-10-CM

## 2024-01-23 PROCEDURE — 92083 EXTENDED VISUAL FIELD XM: CPT

## 2024-01-23 PROCEDURE — 99213 OFFICE O/P EST LOW 20 MIN: CPT

## 2024-01-23 ASSESSMENT — VISUAL ACUITY
OS_CC: 20/20
OD_CC: 20/30
OS_SC: J1+
OS_BAT: 20/50
OD_SC: J1+

## 2024-01-23 ASSESSMENT — TONOMETRY
OS_IOP_MMHG: 15
OD_IOP_MMHG: 15

## 2024-02-08 NOTE — PATIENT DISCUSSION
Moderate Low Tension Glaucoma OU - -Patient to continue with current gtt regimen. Condition was discussed with patient and patient understands. Will continue to monitor patient for any progression in condition. Patient was advised to call us with any problems questions or concerns. show

## 2024-07-29 ENCOUNTER — COMPREHENSIVE EXAM (OUTPATIENT)
Dept: URBAN - METROPOLITAN AREA CLINIC 1 | Facility: CLINIC | Age: 74
End: 2024-07-29

## 2024-07-29 DIAGNOSIS — H40.1232: ICD-10-CM

## 2024-07-29 DIAGNOSIS — E11.9: ICD-10-CM

## 2024-07-29 DIAGNOSIS — H04.123: ICD-10-CM

## 2024-07-29 DIAGNOSIS — H43.813: ICD-10-CM

## 2024-07-29 DIAGNOSIS — H26.492: ICD-10-CM

## 2024-07-29 DIAGNOSIS — H35.372: ICD-10-CM

## 2024-07-29 PROCEDURE — 99214 OFFICE O/P EST MOD 30 MIN: CPT

## 2024-07-29 PROCEDURE — 92133 CPTRZD OPH DX IMG PST SGM ON: CPT

## 2024-07-29 ASSESSMENT — VISUAL ACUITY
OD_CC: 20/25
OS_BAT: 20/50
OS_CC: 20/20

## 2024-07-29 ASSESSMENT — TONOMETRY
OS_IOP_MMHG: 12
OD_IOP_MMHG: 12

## 2025-01-30 ENCOUNTER — FOLLOW UP (OUTPATIENT)
Age: 75
End: 2025-01-30

## 2025-01-30 DIAGNOSIS — H40.1232: ICD-10-CM

## 2025-01-30 DIAGNOSIS — H04.123: ICD-10-CM

## 2025-01-30 PROCEDURE — 99213 OFFICE O/P EST LOW 20 MIN: CPT

## 2025-01-30 PROCEDURE — 92083 EXTENDED VISUAL FIELD XM: CPT

## 2025-07-31 ENCOUNTER — COMPREHENSIVE EXAM (OUTPATIENT)
Age: 75
End: 2025-07-31

## 2025-07-31 DIAGNOSIS — Z98.890: ICD-10-CM

## 2025-07-31 DIAGNOSIS — H26.492: ICD-10-CM

## 2025-07-31 DIAGNOSIS — H35.372: ICD-10-CM

## 2025-07-31 DIAGNOSIS — Z96.1: ICD-10-CM

## 2025-07-31 DIAGNOSIS — H04.123: ICD-10-CM

## 2025-07-31 DIAGNOSIS — H40.1232: ICD-10-CM

## 2025-07-31 DIAGNOSIS — E11.9: ICD-10-CM

## 2025-07-31 DIAGNOSIS — H43.813: ICD-10-CM

## 2025-07-31 PROCEDURE — 92133 CPTRZD OPH DX IMG PST SGM ON: CPT

## 2025-07-31 PROCEDURE — 92015 DETERMINE REFRACTIVE STATE: CPT

## 2025-07-31 PROCEDURE — 99214 OFFICE O/P EST MOD 30 MIN: CPT
